# Patient Record
Sex: MALE | Race: WHITE | Employment: FULL TIME | URBAN - METROPOLITAN AREA
[De-identification: names, ages, dates, MRNs, and addresses within clinical notes are randomized per-mention and may not be internally consistent; named-entity substitution may affect disease eponyms.]

---

## 2018-08-24 ENCOUNTER — NURSE TRIAGE (OUTPATIENT)
Dept: NURSING | Facility: CLINIC | Age: 38
End: 2018-08-24

## 2018-08-24 NOTE — TELEPHONE ENCOUNTER
Redness is in the area of the cut. It is tender to touch and to move the ankle around. I advised taking a pen to mariia the edge of the redness and call back if that increases. His tetanus is up to date.  Jazzy Healy RN-[facility]      Additional Information    Negative: [1] Major bleeding (e.g., actively dripping or spurting) AND [2] can't be stopped    Negative: Amputation    Negative: Shock suspected (e.g., cold/pale/clammy skin, too weak to stand, low BP, rapid pulse)    Negative: [1] Knife wound (or other possibly deep cut) AND [2] to chest, abdomen, back, neck, or head    Negative: [1] Cutter (self-mutilator) AND [2] suicidal or out-of-control    Negative: Sounds like a life-threatening emergency to the triager    Negative: [1] Animal bite AND [2] broken skin    Negative: [1] Human bite AND [2] broken skin    Negative: [1] Bleeding AND [2] won't stop after 10 minutes of direct pressure (using correct technique)    Negative: Skin is split open or gaping  (or length > 1/2 inch or 12 mm on the skin, 1/4 inch or 6 mm on the face)    Negative: [1] Deep cut AND [2] can see bone or tendons    Negative: Sensation of something in the wound (i.e., retained object in wound)    Negative: [1] Dirt in the wound AND [2] not removed with 15 minutes of scrubbing    Negative: Wound causes numbness (i.e., loss of sensation)    Negative: Wound causes weakness (i.e., decreased ability to move hand, finger, toe)    Negative: [1] SEVERE pain AND [2] not improved 2 hours after pain medicine    Negative: [1] Looks infected AND [2] large red area (>2 inches or 5 cm) or streak    Negative: [1] Fever AND [2] bright red area or streak    Negative: Suspicious history for the injury    Negative: [1] Looks infected (spreading redness, pus) AND [2] no fever    Negative: [1] Last tetanus shot > 5 years ago AND [2] DIRTY cut    Negative: [1] Last tetanus shot > 10 years ago AND [2] CLEAN cut    Negative: [1] After 14 days AND  [2] wound isn't healed    Negative: [1] Diabetic (diabetes mellitus) AND [2] minor cut or scratch on foot    Negative: [1] Cutter (self-mutilator) AND [2] stable (i.e., not suicidal, not out of control)    Minor cut or scratch (all triage questions negative)    Protocols used: CUTS AND LACERATIONS-ADULT-

## 2019-10-11 ENCOUNTER — VIRTUAL VISIT (OUTPATIENT)
Dept: FAMILY MEDICINE | Facility: OTHER | Age: 39
End: 2019-10-11

## 2019-10-13 NOTE — PROGRESS NOTES
"Date: 10/11/2019 21:25:31  Clinician: Sven Mercado  Clinician NPI: [id number]  Patient: Edward Hinds  Patient : 1980  Patient Address: [address] [place] [id number]  Patient Phone: [phone]  Visit Protocol: Eye conditions  Patient Summary:  Edward is a 38 year old (: 1980 ) male who initiated a Visit for conjunctivitis.  When asked the question \"Please sign me up to receive news, health information and promotions from [employer].\", Edward responded \"No\".   A synchronous visit is necessary because the patient reported the following abnormal symptoms:   Sensitivity to light (requires clarification)   Images of his eye condition were uploaded.   His symptoms started today and affect the left eye. The symptoms consist of eye redness, light sensitivity, itchy eye(s), and drainage coming from the eye(s).   Symptom details     Drainage: The color of the drainage coming out of his eye(s) is green. The drainage is thick and causes his eyelids to be stuck shut in the morning.    Itchiness: Edward does not have seasonal allergies or hay fever.     Denied symptoms include eyelid swelling, eye pain, and bumps on the eyelid. Edward does not have subconjunctival hemorrhage and has not experienced a decrease in vision. He does not feel feverish.   Precipitating events  Edward has recently been exposed to someone with a red eye or an eye infection. In the past 2 weeks, he has had a cold or an ear infection.   Edward has not had a recent diagnosis of conjunctivitis. He also has not had a recent eye surgery, foreign body in the eye(s), and eye injury. He does not wear contact lenses.   Pertinent medical history  Edward has not ever been diagnosed with glaucoma.   Edward is not taking medication to treat his current symptoms.   Edward does not require proof of evaluation of his eye condition before returning to school, work, or .   Edward does not smoke or use smokeless tobacco.     MEDICATIONS: No current " medications, ALLERGIES: NKDA  Clinician Response:  Dear Edward,  Based on the information provided, you most likely have bacterial conjunctivitis, more commonly called pink eye.  Medication information  I am prescribing:  Polymyxin B sulf-trimethoprim (Polytrim) 10,000 unit- 1 mg/mL ophthalmic (eye) drops. Apply 1 drop into the affected eye(s) every 3 hours, up to 6 times a day for 7 days. There are no refills with this prescription.  The medication I prescribed is an antibiotic medication. Infections can be caused by either bacteria or a virus, and often have similar symptoms, so it is possible that this is a viral infection. Antibiotics are only effective against bacterial infections, so when it is caused by a virus, the medication will not help symptoms improve or make it less contagious.  Self care  To reduce the spread of the eye infection, be sure to wash your hands at least once per hour and avoid touching the eyes as much as possible.  The following will reduce the risk for future eye infections:     Frequent handwashing    Replace towels and washcloths daily    Do not share towels and washcloths with others     Steps you can take to be as comfortable as possible:     Avoid rubbing your eyes    Apply a cool compress to the eye(s)    Take regular breaks and remember to blink regularly when reading or using a computer for long periods of time    Wear sunglasses when outside    Wear eye protection when swimming or working with chemicals    Use good lighting     When to seek care  Please make an appointment to be seen in a clinic or urgent care if any of the following occurs:     You develop new symptoms or your symptoms becomes worse.    Your symptoms do not improve within 2 days of starting treatment.      Diagnosis: Bacterial conjunctivitis  Diagnosis ICD: H10.9  Triage Notes: I reviewed the patient's history, verified their identity, and explained the Visit process.    Patient states his daughter and family  have conjunctivitis. He started having a penis a redeye today. Little bit of sensitivity to light. He does not wear any contacts. He does not feel like there's anything in his eye. He denies any visual changes. I advised treatment as prophylaxis. Your symptoms worsen he should be seen in the clinic.  Synchronous Triage: phone, status: completed, duration: 239 seconds  Prescription: polymyxin B sulf-trimethoprim (Polytrim) 10,000 unit- 1 mg/mL ophthalmic (eye) drops 1 10 ml dropper bottle, 7 days supply. Apply 1 drop into the affected eye(s) every 3 hours up to 6 times a day for 7 days. Refills: 0, Refill as needed: no, Allow substitutions: yes  Pharmacy: [facility] - [phone] - [address] [facility] [place] [address]

## 2020-02-24 ENCOUNTER — HEALTH MAINTENANCE LETTER (OUTPATIENT)
Age: 40
End: 2020-02-24

## 2020-12-13 ENCOUNTER — HEALTH MAINTENANCE LETTER (OUTPATIENT)
Age: 40
End: 2020-12-13

## 2021-04-17 ENCOUNTER — HEALTH MAINTENANCE LETTER (OUTPATIENT)
Age: 41
End: 2021-04-17

## 2021-09-26 ENCOUNTER — HEALTH MAINTENANCE LETTER (OUTPATIENT)
Age: 41
End: 2021-09-26

## 2022-05-02 ENCOUNTER — APPOINTMENT (OUTPATIENT)
Dept: GENERAL RADIOLOGY | Facility: CLINIC | Age: 42
End: 2022-05-02
Attending: PHYSICIAN ASSISTANT
Payer: COMMERCIAL

## 2022-05-02 ENCOUNTER — HOSPITAL ENCOUNTER (EMERGENCY)
Facility: CLINIC | Age: 42
Discharge: HOME | End: 2022-05-02
Attending: PHYSICIAN ASSISTANT | Admitting: PHYSICIAN ASSISTANT
Payer: COMMERCIAL

## 2022-05-02 ENCOUNTER — APPOINTMENT (OUTPATIENT)
Dept: MRI IMAGING | Facility: CLINIC | Age: 42
End: 2022-05-02
Attending: PHYSICIAN ASSISTANT
Payer: COMMERCIAL

## 2022-05-02 VITALS
HEART RATE: 66 BPM | SYSTOLIC BLOOD PRESSURE: 118 MMHG | WEIGHT: 199 LBS | TEMPERATURE: 99 F | RESPIRATION RATE: 18 BRPM | BODY MASS INDEX: 26.95 KG/M2 | HEIGHT: 72 IN | OXYGEN SATURATION: 99 % | DIASTOLIC BLOOD PRESSURE: 87 MMHG

## 2022-05-02 DIAGNOSIS — M54.16 LUMBAR RADICULOPATHY: ICD-10-CM

## 2022-05-02 DIAGNOSIS — M51.16 LUMBAR DISC DISEASE WITH RADICULOPATHY: ICD-10-CM

## 2022-05-02 PROCEDURE — 99284 EMERGENCY DEPT VISIT MOD MDM: CPT | Performed by: PHYSICIAN ASSISTANT

## 2022-05-02 PROCEDURE — 99284 EMERGENCY DEPT VISIT MOD MDM: CPT | Mod: 25

## 2022-05-02 PROCEDURE — 72148 MRI LUMBAR SPINE W/O DYE: CPT

## 2022-05-02 PROCEDURE — 72100 X-RAY EXAM L-S SPINE 2/3 VWS: CPT

## 2022-05-02 PROCEDURE — 250N000013 HC RX MED GY IP 250 OP 250 PS 637: Performed by: PHYSICIAN ASSISTANT

## 2022-05-02 RX ORDER — METHYLPREDNISOLONE 4 MG
TABLET, DOSE PACK ORAL
Qty: 21 TABLET | Refills: 0 | Status: SHIPPED | OUTPATIENT
Start: 2022-05-02 | End: 2023-01-07

## 2022-05-02 RX ORDER — IBUPROFEN 200 MG
600 TABLET ORAL ONCE
COMMUNITY

## 2022-05-02 RX ORDER — IBUPROFEN 600 MG/1
600 TABLET, FILM COATED ORAL ONCE
Status: COMPLETED | OUTPATIENT
Start: 2022-05-02 | End: 2022-05-02

## 2022-05-02 RX ADMIN — IBUPROFEN 600 MG: 600 TABLET ORAL at 12:33

## 2022-05-02 NOTE — ED TRIAGE NOTES
Pt presents with concerns of increased pain in the back.  Pt states that pain has been for two weeks.  Pt states that the pain is the low the back down the right leg.  Pt states that he has a numbness feeling in the heel of the foot.   No particular trauma or fall that triggered this.  No medications taken today for pain, yesterday he states he tried Ibuprofen.

## 2022-05-02 NOTE — ED PROVIDER NOTES
History     Chief Complaint   Patient presents with     Back Pain       HPI  Edward Hinds is a 41 year old male who presents to the emergency department complaining of back pain. The patient reports he has had low back pain for the last 2 weeks.  He has been managing it with stretching.  Saturday, 2 days ago however he started having pain and numbness down his right posterior leg into the heel of his foot.  He took ibuprofen yesterday for the pain but has not taken anything otherwise since pain started.  He denies any loss of bowel or bladder control but does note some increased difficulty urinating like the urine does not come out as easily.  He had a bowel movement today which was normal.  Has noticed some numbness in the groin when sitting.  Sitting and laying flat makes the pain worse and he has been favoring his left leg due to the right leg hurting with weight.  He is walking okay.  No reported fevers or vomiting.  No known injury or trauma to the back.        Allergies:  Allergies   Allergen Reactions     Penicillin [Penicillins]      Mother says penicillin         Problem List:    Patient Active Problem List    Diagnosis Date Noted     GERD (gastroesophageal reflux disease) 02/22/2013     Priority: Medium     CARDIOVASCULAR SCREENING; LDL GOAL LESS THAN 160 01/08/2013     Priority: Medium        Past Medical History:    No past medical history on file.    Past Surgical History:    No past surgical history on file.    Family History:    Family History   Problem Relation Age of Onset     Hypertension Father      Alzheimer Disease Maternal Grandmother      Heart Disease Maternal Grandfather        Social History:  Marital Status:  Single [1]  Social History     Tobacco Use     Smoking status: Former Smoker     Packs/day: 0.50     Years: 12.00     Pack years: 6.00     Types: Cigarettes     Start date: 12/21/2013     Smokeless tobacco: Never Used   Substance Use Topics     Alcohol use: Yes     Comment:  1-3/week     Drug use: No        Medications:    ibuprofen (ADVIL/MOTRIN) 200 MG tablet  methylPREDNISolone (MEDROL DOSEPAK) 4 MG tablet therapy pack          Review of Systems   All other systems reviewed and are negative.      Physical Exam   BP: (!) 132/90  Pulse: 93  Temp: 99  F (37.2  C)  Resp: 18  Height: 182.9 cm (6')  Weight: 90.3 kg (199 lb)  SpO2: 98 %      Physical Exam  Vitals and nursing note reviewed. Exam conducted with a chaperone present.   Constitutional:       General: He is not in acute distress.     Appearance: Normal appearance. He is not ill-appearing, toxic-appearing or diaphoretic.   HENT:      Head: Normocephalic and atraumatic.   Eyes:      Extraocular Movements: Extraocular movements intact.      Conjunctiva/sclera: Conjunctivae normal.   Pulmonary:      Effort: Pulmonary effort is normal. No respiratory distress.   Genitourinary:     Rectum: Normal anal tone.      Comments: Bladder scan with postvoid residual: 18 mL  Musculoskeletal:      Cervical back: Normal and neck supple.      Thoracic back: Normal.      Lumbar back: Tenderness (right buttocks soft tissue) and bony tenderness (to low lumbar upper sacral midline region) present. Positive right straight leg raise test. Negative left straight leg raise test.      Comments: Patient exhibits normal strength with flexion and extension of hips bilaterally.  Normal dorsi and plantar flexion bilaterally.  Normal gait.  Able to stand on one foott independently with each side, though right appears slightly wobbly.   Skin:     General: Skin is warm and dry.   Neurological:      General: No focal deficit present.      Mental Status: He is alert and oriented to person, place, and time. Mental status is at baseline.   Psychiatric:         Mood and Affect: Mood normal.         Behavior: Behavior normal.         ED Course           Procedures      Results for orders placed or performed during the hospital encounter of 05/02/22 (from the past 24  hour(s))   Lumbar spine XR, 2-3 views    Narrative    LUMBAR SPINE TWO TO THREE VIEWS   5/2/2022 1:00 PM     HISTORY: Low back pain    COMPARISON: None.      Impression    IMPRESSION: No fracture is identified. Mild lower lumbar spine  degenerative disc disease and mild-to-moderate lower lumbar spine  facet arthropathy. Probable grade 1 retrolisthesis of L4 on L5.  Levoconvex curvature of the thoracolumbar spine.    RIGOBERTO ARANGO MD         SYSTEM ID:  [id number]   Lumbar spine MRI w/o contrast - surgery >10 yrs or none    Narrative    EXAM: MR LUMBAR SPINE W/O CONTRAST  LOCATION: [facility]  DATE/TIME: 5/2/2022 5:30 PM    INDICATION: Low back pain, cauda equina syndrome suspected.  COMPARISON: None.  TECHNIQUE: Routine Lumbar Spine MRI without IV contrast.    FINDINGS:   Nomenclature is based on 5 lumbar type vertebral bodies. Normal vertebral body heights, alignment and marrow signal. No fracture, destructive bone lesion or infection. Normal distal spinal cord and cauda equina with conus medullaris at T12-L1. No   extraspinal abnormality. Unremarkable visualized bony pelvis.    Findings on a level by level basis are as follows:    T12-L1: Normal disc height and signal. No herniation. Normal facets. No spinal canal or neural foraminal stenosis.     L1-L2: Normal disc height and signal. No herniation. Normal facets. No spinal canal or neural foraminal stenosis.    L2-L3: Normal disc height and signal. No herniation. Normal facets. No spinal canal or neural foraminal stenosis.     L3-L4: Normal disc height and signal. Shallow disc bulge. No disc herniation. Mild facet arthrosis and ligamentum flavum thickening. Mild bilateral neural foraminal stenosis. Mild spinal canal stenosis.    L4-L5: Mild disc height loss. Loss of disc signal. Disc bulge with superimposed 5 mm AP dimension right subarticular inferiorly migrated disc extrusion, which partially effaces the right subarticular zone  and displaces the descending right L5 nerve   roots. Facet arthrosis and ligamentum flavum thickening. Mild bilateral neural foraminal stenosis. Mild to moderate spinal canal stenosis.    L5-S1: Moderate disc height loss. Loss of disc signal. Disc bulge with superimposed 10 mm AP dimension by 24 mm craniocaudal dimension superiorly migrated right subarticular disc extrusion, which displaces the descending right L5 nerve roots within the   right L5 lateral recess and right S1 nerve roots in the subarticular zone. Facet arthrosis. Mild bilateral neural foraminal stenosis. Moderate spinal canal stenosis.      Impression    IMPRESSION:  1. Multilevel lumbar spondylosis with large superiorly migrated right subarticular disc extrusion at L5-S1, which displaces and likely impinges both the descending right L5 and S1 nerve root segments.  2. Small right subarticular disc protrusion at L4-L5 displaces the descending right L5 nerve roots.  3. Moderate spinal canal stenosis at L5-S1. Mild to moderate spinal canal stenosis at L4-L5.  4. Mild to moderate left neural foraminal stenosis at L4-L5.  5. Mild bilateral neural foraminal stenosis L3-S1.       Medications   ibuprofen (ADVIL/MOTRIN) tablet 600 mg (600 mg Oral Given 5/2/22 1233)         Assessments & Plan (with Medical Decision Making)  Edward Hinds is a 41 year old plaint of back pain for the last 2 weeks, worsened in the last 2 days with associated numbness and pain down the right leg.  Also notes some hesitancy with urination but BMs are normal did note some numbness in the groin with seeding but none currently.  Denies any injury or trauma to the back.  On arrival to the ED he was afebrile with normal vital signs.  Exam today demonstrated bony tenderness in the low lumbosacral region with muscular tenderness in the right buttocks he did have normal strength in both legs but did seem a little wobbly standing on the right foot.  Positive straight leg raise on the  right.  Negative on the left.  Symptoms are highly concerning for spinal impingement of some sort.  Question if he could have cauda equina given his complaints of occasional groin numbness and urinary hesitancy.  Discussed option of MRI with the patient.  There would be a delay with MRI so he preferred to start with x-ray.  Offered him something for pain and he requested ibuprofen so this was given.  X-ray of the spine showed some degeneration but nothing acute otherwise.  I do have concern for more severe neurological etiology so after discussing with patient he was agreeable to waiting several hours to have an MRI completed this evening.  He did report ibuprofen alleviated his symptoms.  MRI today did show a large superiorly migrated right subarticular disc extrusion at L5-S1 which displaces and impinged both the descending right L5 and S1 nerve root segments.  He had additional findings to include moderate spinal canal stenosis at L5-S1 with other stenosis findings at L3-S1 and L4-L5.  He had a small right subarticular disc protrusion at L4-L5 displacing the descending right L5 nerve roots.  Given severity of MRI findings, I did call and speak with neurosurgical CHANDRAKANT Cochran.  She reviewed images and patient's case with her team.  I did check rectal tone and this was normal and we did a postvoid residual bladder scan which showed less than 18 mL, both of which are reassuring findings.  At this point, with MRI findings in combination with exam, neurosurgical team felt it reasonable to have him follow-up closely in the clinic to discuss options, likely will need surgery down the road.  The clinic will call him tomorrow to schedule this for him.  In the meantime he will be prescribed a Medrol Dosepak and I advised continued use of ibuprofen for pain control as needed.  I did go over warning signs and symptoms of when he should return to the ED.  All questions were answered and patient was discharged home in  suitable condition.     I have reviewed the nursing notes.    I have reviewed the findings, diagnosis, plan and need for follow up with the patient.    New Prescriptions    METHYLPREDNISOLONE (MEDROL DOSEPAK) 4 MG TABLET THERAPY PACK    Follow Package Directions       Final diagnoses:   Lumbar radiculopathy   Lumbar disc disease with radiculopathy     Note: Chart documentation done in part with Dragon Voice Recognition software. Although reviewed after completion, some word and grammatical errors may remain.     5/2/2022   [facility] EMERGENCY DEPT     Jocelin High PA-C  05/02/22 [id number]

## 2022-05-03 NOTE — PROGRESS NOTES
Contacted regarding 40 yo presenting to ER with 2 weeks of low back pain and 3 days of right leg pain and numbness. Per ER scrotal numbness when sitting.  No bowel or bladder incontinence.  Strength intact per ER, normal rectal tone, no urinary retention (PVR 18)   MRI reveals large HNP at L5-S1.    IMPRESSION:  1. Multilevel lumbar spondylosis with large superiorly migrated right subarticular disc extrusion at L5-S1, which displaces and likely impinges both the descending right L5 and S1 nerve root segments.  2. Small right subarticular disc protrusion at L4-L5 displaces the descending right L5 nerve roots.  3. Moderate spinal canal stenosis at L5-S1. Mild to moderate spinal canal stenosis at L4-L5.  4. Mild to moderate left neural foraminal stenosis at L4-L5.  5. Mild bilateral neural foraminal stenosis L3-S1.    RECOMMENDATIONS:  Okay to discharge from ER with close outpatient NS follow up.  Our office will contact patient to arrange clinic visit.  Medrol dose pack.    Discussed with Dr. Dudley.    Leonor Cochran, MPAS  [facility] Neurosurgery  [address] [address]    Tel [phone]  Pager [phone]

## 2022-05-05 ENCOUNTER — TELEPHONE (OUTPATIENT)
Dept: NEUROSURGERY | Facility: OTHER | Age: 42
End: 2022-05-05

## 2022-05-05 ENCOUNTER — TELEPHONE (OUTPATIENT)
Dept: PALLIATIVE MEDICINE | Facility: CLINIC | Age: 42
End: 2022-05-05

## 2022-05-05 ENCOUNTER — OFFICE VISIT (OUTPATIENT)
Dept: NEUROSURGERY | Facility: CLINIC | Age: 42
End: 2022-05-05
Attending: PHYSICIAN ASSISTANT
Payer: COMMERCIAL

## 2022-05-05 VITALS — OXYGEN SATURATION: 97 % | DIASTOLIC BLOOD PRESSURE: 94 MMHG | HEART RATE: 64 BPM | SYSTOLIC BLOOD PRESSURE: 136 MMHG

## 2022-05-05 DIAGNOSIS — M51.16 LUMBAR DISC DISEASE WITH RADICULOPATHY: ICD-10-CM

## 2022-05-05 DIAGNOSIS — M54.16 LUMBAR RADICULOPATHY: ICD-10-CM

## 2022-05-05 PROCEDURE — 99203 OFFICE O/P NEW LOW 30 MIN: CPT | Performed by: NEUROLOGICAL SURGERY

## 2022-05-05 RX ORDER — OXYCODONE HYDROCHLORIDE 5 MG/1
5 TABLET ORAL EVERY 6 HOURS PRN
Qty: 30 TABLET | Refills: 0 | Status: SHIPPED | OUTPATIENT
Start: 2022-05-05 | End: 2023-01-07

## 2022-05-05 ASSESSMENT — PAIN SCALES - GENERAL: PAINLEVEL: MILD PAIN (3)

## 2022-05-05 NOTE — PROGRESS NOTES
I was asked by Dr. High to see this patient in consultation    41 year old male with severe right leg pain, disc herniation.  A couple weeks of back pain after re-doing the radha in his bathroom.  This weekend, developed, severe 10/10, shooting pain radiating to the right buttocks, thigh, calf, and foot, worse with activity.  Went to the ED, and was started on a steroid pack.  MR Lumbar, personally reviewed, showed large right L5-S1 extruded disc herniation and L4-5 disc herniation.       No past medical history on file.  No past surgical history on file.  Social History     Socioeconomic History     Marital status: Single     Spouse name: Not on file     Number of children: Not on file     Years of education: Not on file     Highest education level: Not on file   Occupational History     Not on file   Tobacco Use     Smoking status: Former Smoker     Packs/day: 0.50     Years: 12.00     Pack years: 6.00     Types: Cigarettes     Start date: 12/21/2013     Smokeless tobacco: Never Used   Substance and Sexual Activity     Alcohol use: Yes     Comment: 1-3/week     Drug use: No     Sexual activity: Yes     Partners: Female   Other Topics Concern     Parent/sibling w/ CABG, MI or angioplasty before 65F 55M? No   Social History Narrative     Not on file     Social Determinants of Health     Financial Resource Strain: Not on file   Food Insecurity: Not on file   Transportation Needs: Not on file   Physical Activity: Not on file   Stress: Not on file   Social Connections: Not on file   Intimate Partner Violence: Not on file   Housing Stability: Not on file     Family History   Problem Relation Age of Onset     Hypertension Father      Alzheimer Disease Maternal Grandmother      Heart Disease Maternal Grandfather         ROS: 10 point ROS neg other than the symptoms noted above in the HPI.    Physical Exam  BP (!) 136/94   Pulse 64   SpO2 97%   HEENT:  Normocephalic, atraumatic.  PERRLA.  EOM s intact.  Visual  fields full to gross exam  Neck:  Supple, non-tender, without lymphadenopathy.  Heart:  No peripheral edema  Lungs:  No SOB  Abdomen:  Non-distended.   Skin:  Warm and dry.  Extremities:  No edema, cyanosis or clubbing.  Psychiatric:  No apparent distress  Musculoskeletal:  Normal bulk and tone    NEUROLOGICAL EXAMINATION:     Mental status:  Alert and Oriented x 3, speech is fluent.  Cranial nerves:  II-XII intact.   Motor:    Shoulder Abduction:  Right:  5/5   Left:  5/5  Biceps:                      Right:  5/5   Left:  5/5  Triceps:                     Right:  5/5   Left:  5/5  Wrist Extensors:       Right:  5/5   Left:  5/5  Wrist Flexors:           Right:  5/5   Left:  5/5  interosseus :            Right:  5/5   Left:  5/5  Hip Flexion:                Right: 5/5  Left:  5/5  Quadriceps:             Right:  5/5  Left:  5/5  Hamstrings:             Right:  5/5  Left:  5/5  Gastroc Soleus:        Right:  5/5  Left:  5/5  Tib/Ant:                      Right:  5/5  Left:  5/5  EHL:                     Right:  5/5  Left:  5/5  Sensation:  Intact  Reflexes:  Negative Babinski.  Negative Clonus.  Negative Mckeon's.  Coordination:  Smooth finger to nose testing.   Negative pronator drift.  Smooth tandem walking.    A/P:  41 year old male with severe right leg pain, disc herniation    I had a discussion with the patient, reviewing the history, symptoms, and imaging  Discussed options of TALYA/PT vs surgery  He is planning to try the injection

## 2022-05-05 NOTE — TELEPHONE ENCOUNTER
Patient tried to schedule injection at [facility], and can't get in for at least a month.  Scheduling looked into Priyank's schedule and his clinic for consults is full for month of May.  Patient was under the impression that an injection for pain could be done immediately.  Please advise patient on how to proceed.

## 2022-05-05 NOTE — NURSING NOTE
Edward Hinds is a 41 year old male who presents for:  Chief Complaint   Patient presents with     Neurologic Problem     ED follow-up, lumbar disc extrusion L5-S1        Initial Vitals:  BP (!) 136/94   Pulse 64   SpO2 97%  Estimated body mass index is 26.99 kg/m  as calculated from the following:    Height as of 5/2/22: 6' (1.829 m).    Weight as of 5/2/22: 199 lb (90.3 kg).. There is no height or weight on file to calculate BSA. BP completed using cuff size: regular  Mild Pain (3)    Nursing Comments: 3/10 pain. R LBP radiates to LE w/ tingling/numbness present.    Wilber Oneill MA

## 2022-05-05 NOTE — PATIENT INSTRUCTIONS
Script for Oxycodone      Order placed for a Right L4-5 and L5-S1 epidural steroid injection. The steroid can take 14 days to reach max effect. Please call our clinic if symptoms persist after this timeframe. [facility] will call to schedule your injection, however if you don't hear from them within 48 hours, call [phone]    [facility] They will call you to schedule within a few days. If you have not heard from them, please call [phone].  Please call our clinic if symptoms persist after your course of physical therapy (approximately 4 weeks).     [facility]  [facility] - [address] [address]  Telephone:  [phone]       Fax:  [phone]

## 2022-05-05 NOTE — TELEPHONE ENCOUNTER
Patient wife Leslie called, notified her [facility]ita next open availability was June 3rd, she explained they  didn't want to wait that long and will call other places to see if they can get him in sooner.

## 2022-05-06 NOTE — TELEPHONE ENCOUNTER
Patient called in stating that he can't get in for at least a month at  for an injection.  Scheduling looked into Priyank's schedule and his clinic for consults is full for month of May    Patient sent [employer] message to patient with external location options and numbers to call to inquire about soonest appt date, as patient wants to get in asa.

## 2022-05-08 ENCOUNTER — HEALTH MAINTENANCE LETTER (OUTPATIENT)
Age: 42
End: 2022-05-08

## 2022-05-11 RX ORDER — NICOTINE POLACRILEX 4 MG
15-30 LOZENGE BUCCAL
Status: DISCONTINUED | OUTPATIENT
Start: 2022-05-11 | End: 2022-05-14 | Stop reason: HOSPADM

## 2022-05-11 RX ORDER — DEXTROSE MONOHYDRATE 25 G/50ML
25-50 INJECTION, SOLUTION INTRAVENOUS
Status: DISCONTINUED | OUTPATIENT
Start: 2022-05-11 | End: 2022-05-14 | Stop reason: HOSPADM

## 2022-05-12 ENCOUNTER — TELEPHONE (OUTPATIENT)
Dept: MEDSURG UNIT | Facility: CLINIC | Age: 42
End: 2022-05-12
Payer: COMMERCIAL

## 2022-05-13 ENCOUNTER — HOSPITAL ENCOUNTER (OUTPATIENT)
Dept: GENERAL RADIOLOGY | Facility: CLINIC | Age: 42
Discharge: HOME | End: 2022-05-13
Attending: NEUROLOGICAL SURGERY
Payer: COMMERCIAL

## 2022-05-13 ENCOUNTER — HOSPITAL ENCOUNTER (OUTPATIENT)
Facility: CLINIC | Age: 42
Discharge: HOME | End: 2022-05-13
Admitting: PHYSICIAN ASSISTANT
Payer: COMMERCIAL

## 2022-05-13 VITALS
HEART RATE: 58 BPM | RESPIRATION RATE: 16 BRPM | DIASTOLIC BLOOD PRESSURE: 94 MMHG | OXYGEN SATURATION: 95 % | SYSTOLIC BLOOD PRESSURE: 146 MMHG

## 2022-05-13 VITALS — OXYGEN SATURATION: 98 % | SYSTOLIC BLOOD PRESSURE: 127 MMHG | DIASTOLIC BLOOD PRESSURE: 87 MMHG | HEART RATE: 62 BPM

## 2022-05-13 DIAGNOSIS — M54.16 LUMBAR RADICULOPATHY: ICD-10-CM

## 2022-05-13 PROCEDURE — 999N000154 HC STATISTIC RADIOLOGY XRAY, US, CT, MAR, NM

## 2022-05-13 PROCEDURE — 62323 NJX INTERLAMINAR LMBR/SAC: CPT

## 2022-05-13 PROCEDURE — 250N000009 HC RX 250: Performed by: NEUROLOGICAL SURGERY

## 2022-05-13 PROCEDURE — 255N000002 HC RX 255 OP 636: Performed by: NEUROLOGICAL SURGERY

## 2022-05-13 PROCEDURE — 250N000011 HC RX IP 250 OP 636: Performed by: NEUROLOGICAL SURGERY

## 2022-05-13 RX ORDER — IOPAMIDOL 408 MG/ML
10 INJECTION, SOLUTION INTRATHECAL ONCE
Status: COMPLETED | OUTPATIENT
Start: 2022-05-13 | End: 2022-05-13

## 2022-05-13 RX ORDER — BETAMETHASONE SODIUM PHOSPHATE AND BETAMETHASONE ACETATE 3; 3 MG/ML; MG/ML
6 INJECTION, SUSPENSION INTRA-ARTICULAR; INTRALESIONAL; INTRAMUSCULAR; SOFT TISSUE ONCE
Status: COMPLETED | OUTPATIENT
Start: 2022-05-13 | End: 2022-05-13

## 2022-05-13 RX ADMIN — BETAMETHASONE SODIUM PHOSPHATE AND BETAMETHASONE ACETATE 3 MG: 3; 3 INJECTION, SUSPENSION INTRA-ARTICULAR; INTRALESIONAL; INTRAMUSCULAR at 09:42

## 2022-05-13 RX ADMIN — LIDOCAINE HYDROCHLORIDE 8 ML: 10 INJECTION, SOLUTION EPIDURAL; INFILTRATION; INTRACAUDAL; PERINEURAL at 09:21

## 2022-05-13 RX ADMIN — IOPAMIDOL 2 ML: 408 INJECTION, SOLUTION INTRATHECAL at 09:41

## 2022-05-13 NOTE — PROGRESS NOTES
Care Suites Discharge Nursing Note    Patient Information  Name: Edward Hinds  Age: 41 year old    Discharge Education:  Discharge instructions reviewed: Yes  Additional education/resources provided: yes  Patient/patient representative verbalizes understanding: Yes  Patient discharging on new medications: No  Medication education completed: N/A    Discharge Plans:   Discharge location: home  Discharge ride contacted: Yes  Approximate discharge time: 1010    Discharge Criteria:  Discharge criteria met and vital signs stable: Yes    Patient Belongs:  Patient belongings returned to patient: N/A    Margie Taveras RN

## 2022-05-13 NOTE — DISCHARGE INSTRUCTIONS
Steroid Injection Discharge Instructions     After you go home:    You may resume your normal diet.    Care of Puncture Site:    If you have a bandaid on your puncture site, you may remove it the next morning  You may shower tomorrow  No bath tubs, whirlpools or swimming pool for at least 48 hours  Use ice packs as needed for discomfort     Activity:    Minimize your activity today. You may gradually resume your normal activity as tolerated  Avoid vigorous or strenuous activity until your symptoms improve or as directed by your doctor  Do NOT drive a vehicle for a few hours after the injection - or longer if you develop numbness in your arm or leg    Medicines:    You may resume all medications, including blood thinners  For minor discomfort, you may take Acetaminophen (Tylenol) or Ibuprofen (Advil)    Pain:     You may experience increased or different pain over the next 24-48 hours  For the next 48 hrs - you may use ice packs for discomfort     Call your primary care doctor if:    You have severe pain that does not improve with pain medication  You have chills or a fever greater than 101 F (38 C)  The site is red, swollen, hot or tender  Increase in pain, weakness or numbness  New problems with your bowel or bladder  Any questions or concerns    What to watch for:    It can be normal to have some bruising or slight swelling at the puncture site.   After the procedure, you may have some new weakness or numbness down your arm/leg from the numbing medicine. This should resolve in a few hours.   You may feel some temporary relief from the numbing medicine, but that will wear off within a few hours.  Your symptoms may return to pre procedure level, or can even be worse for the first 1-2 days.  For many people, the steroid begins to provide some relief within 2-3 days, but it can take up to 2 weeks to obtain the full results.  Some people will get lasting relief from a single injection. Others may require up to 3  injections to get results. If you have more than one steroid injection, they should be given 2 weeks apart.  If you have no improvement in your symptoms after two weeks, please contact the doctor who ordered this procedure to discuss the next steps.  Side effects of your steroid injection are mild and will go away in 2-3 days  Insomnia  Irritability  Flushed face  Water retention  Restlessness  Difficulty sleeping  Increased appetite  Increased blood sugar      If you have questions or concerns call:                  [facility] Dept @ [phone]                                    between 8am-4:30pm Mon-Fri    If you have urgent questions outside of these normal business hours, please contact the [facility] on call doctor @ [phone]

## 2022-05-13 NOTE — PROCEDURES
[facility]    Procedure: L4-L5 TALYA and Caudal TALYA for treatment of L5-S1 DDD    Date/Time: 5/13/2022 9:35 AM  Performed by: Den Herrera PA-C  Authorized by: Den Herrera PA-C       UNIVERSAL PROTOCOL   Site Marked: Yes  Prior Images Obtained and Reviewed:  Yes  Required items: Required blood products, implants, devices and special equipment available    Patient identity confirmed:  Verbally with patient  Patient was reevaluated immediately before administering moderate or deep sedation or anesthesia  Confirmation Checklist:  Patient's identity using two indicators, relevant allergies, procedure was appropriate and matched the consent or emergent situation and correct equipment/implants were available  Time out: Immediately prior to the procedure a time out was called    Universal Protocol: the Joint Commission Universal Protocol was followed    Preparation: Patient was prepped and draped in usual sterile fashion       ANESTHESIA    Local Anesthetic: Lidocaine 1% without epinephrine      SEDATION    Patient Sedated: No    See dictated procedure note for full details.    PROCEDURE  Describe Procedure: Minimal posterior epidural fat at L5-S1. Used caudal approach for S1 nerve treatment.  L4-L5 IL TALYA traveled mostly cranially.  Patient Tolerance:  Patient tolerated the procedure well with no immediate complications  Length of time physician/provider present for 1:1 monitoring during sedation: 0

## 2022-05-13 NOTE — PROGRESS NOTES
Care Suites Post Procedure Note    Patient Information  Name: Edward Hinds  Age: 41 year old    Post Procedure  Time patient returned to Care Suites: [id number]  Concerns/abnormal assessment: none  If abnormal assessment, provider notified: Yes  Plan/Other: discharge at 1000    Margie Taveras RN

## 2022-05-17 ENCOUNTER — HOSPITAL ENCOUNTER (OUTPATIENT)
Dept: PHYSICAL THERAPY | Facility: CLINIC | Age: 42
Setting detail: THERAPIES SERIES
Discharge: HOME | End: 2022-05-17
Attending: NEUROLOGICAL SURGERY
Payer: COMMERCIAL

## 2022-05-17 DIAGNOSIS — M54.16 LUMBAR RADICULOPATHY: ICD-10-CM

## 2022-05-17 PROCEDURE — 97161 PT EVAL LOW COMPLEX 20 MIN: CPT | Mod: GP | Performed by: PHYSICAL THERAPIST

## 2022-05-17 PROCEDURE — 97112 NEUROMUSCULAR REEDUCATION: CPT | Mod: GP | Performed by: PHYSICAL THERAPIST

## 2022-05-17 PROCEDURE — 97110 THERAPEUTIC EXERCISES: CPT | Mod: GP | Performed by: PHYSICAL THERAPIST

## 2022-05-17 NOTE — PROGRESS NOTES
05/17/22 0815   General Information   Type of Visit Initial OP Ortho PT Evaluation   Start of Care Date 05/17/22   Referring Physician Dr. Dudley   Patient/Family Goals Statement To get back to normal self.   Orders Evaluate and Treat   Orders Comment None   Date of Order 05/05/22   Certification Required? No   Medical Diagnosis Lumbar radiculopathy   Surgical/Medical history reviewed No   Precautions/Limitations no known precautions/limitations  (No heavy lifting)   Weight-Bearing Status - LUE full weight-bearing   Weight-Bearing Status - RUE full weight-bearing   Weight-Bearing Status - LLE full weight-bearing   Weight-Bearing Status - RLE full weight-bearing   Body Part(s)   Body Part(s) Lumbar Spine/SI   Presentation and Etiology   Pertinent history of current problem (include personal factors and/or comorbidities that impact the POC) Edward reports that he was working on his bathroom, ripping up his bathroom floor, he initially felt discomfort with bending. He then started feeling numbness into the right buttock, down the back of his leg into his heel. He ignored it for about 3 days and noticed that he was spending more time lying on the ground. When the pain was getting too bad he couldn't drive so was working from home. He went to the ED, put on a medrol dose pack. He then saw Dr. Dudley and had an epidural last Friday. Since the epidural he is feeling a lot better and feeling some of the tingling coming back. He is no longer needing pain meds but it is not perfect.   Impairments A. Pain;L. Tingling;K. Numbness;D. Decreased ROM;F. Decreased strength and endurance;H. Impaired gait   Functional Limitations perform activities of daily living;perform required work activities;perform desired leisure / sports activities   Symptom Location Right low back buttock, posterior thigh, calf and numbness to the bottom of the foot.   How/Where did it occur At home  (Bending, tearing out a floor.)   Onset date of current  episode/exacerbation 04/29/22   Chronicity New   Pain rating (0-10 point scale) Other   Pain rating comment Low back is a 5.5/10 typical and 10/10 at worst. The posterior thigh and heel symptoms are a 4/10.   Pain quality H. Other   Pain quality comment Describes more of a heaviness, tingling/numbness than pain   Frequency of pain/symptoms D. Other   Pain frequency comment The back is 50-60% of the day, the buttock and right lower extremity is constant   Pain/symptoms are: Other   Pain symptoms comment Symptoms get worse as the day progresses.   Pain/symptoms exacerbated by I. Bending;G. Certain positions;B. Walking;J. ADL;K. Home tasks;L. Work tasks   Pain/symptoms eased by E. Changing positions;C. Rest;F. Certain positions   Progression of symptoms since onset: Improved  (Since epidural)   Current / Previous Interventions   Diagnostic Tests: MRI   MRI Results Results   MRI results large superiorly migrated right subarticular disc extrusion at L5-S1   Prior Level of Function   Prior Level of Function-Mobility Independent ambulator   Prior Level of Function-ADLs Independent   Functional Level Prior Comment Is an active dad of 5 children, the 6th is on her way.   Current Level of Function   Current Community Support Family/friend caregiver   Patient role/employment history A. 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   Living environment House/townhome   Home/community accessibility No concerns reported   Current equipment-Gait/Locomotion None   Current equipment-ADL None   Fall Risk Screen   Fall screen completed by PT   Have you fallen 2 or more times in the past year? No   Have you fallen and had an injury in the past year? No   Is patient a fall risk? No   System Outcome Measures   Outcome Measures Low Back Pain (see Oswestry and Demi)   Functional Scales   Functional Scales Other   Other Scales  Pt. self reports he is functioning at 60% of his normal.    Lumbar Spine/SI Objective Findings   Gait/Locomotion Non-antalgic gait pattern   Balance/Proprioception (Single Leg Stance) NT   Flexion ROM Limited by 50%   Extension ROM Limited by 70%   Lumbar/SI Special Tests Comments Directional preference using Static/Dynamic Force Analysis. Starting symptoms in standing: right posterior thigh 1/10, heel 1.5/10. Standing flexion does not change symptoms, standing extension decreases the heel to a .5/10. Hooklying is deferred at this time. Prone lying abolishes all symptoms, TATIANA produces low back discomfort 1/10 all other symptoms 0. Prone press-up produces left hamstring. Returned to prone lying and all symptoms are abolished.   Observation Edward is a healthy, well groomed 41 year old male who demonstrates mild/mod. pain behaviors.   Integumentary No deficits identified   Posture neutral sitting and standing posture.   Planned Therapy Interventions   Planned Therapy Interventions manual therapy;neuromuscular re-education;ROM   Planned Therapy Interventions Comment ther. ex., ther. activity, directional preference   Planned Modality Interventions   Planned Modality Interventions Electrical stimulation;Cryotherapy;Hydrotherapy;Ultrasound;Traction   Planned Modality Interventions Comments Will use if needed for symptom control.   Clinical Impression   Criteria for Skilled Therapeutic Interventions Met yes, treatment indicated   PT Diagnosis Mechanical low back pain with radiating right lower extremity symptoms to the heel.   Influenced by the following impairments Pain, numbness, tingling, limited ROM and function   Functional limitations due to impairments Decreased ability to bend, unable to sit, stand or walk for prolonged periods of time.   Clinical Presentation Stable/Uncomplicated   Clinical Presentation Rationale Clinical judgement, history, evaluation   Clinical Decision Making (Complexity) Low complexity   Therapy Frequency 2 times/Week   Predicted Duration of Therapy  Intervention (days/wks) 90 days   Risk & Benefits of therapy have been explained Yes   Patient, Family & other staff in agreement with plan of care Yes   Clinical Impression Comments Edward is demonstrating mechanical low back involvement with radiating right lower extremity symptoms. 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 Will continue to assess strength, bowel and bladder symptoms.   Education Assessment   Preferred Learning Style Listening;Reading;Demonstration   Barriers to Learning No barriers   ORTHO GOALS   PT Ortho Eval Goals 1;2;3   Ortho Goal 1   Goal Identifier Symptoms   Goal Description Edward will use strategies learned in P.T. to decrease symptoms by 50% helping to reach personal goal of symptom relief.   Target Date 06/16/22   Ortho Goal 2   Goal Identifier Radiating symptoms   Goal Description Edward will no longer experience symptoms to the foot indicating centralization of symptoms.   Target Date 07/16/22   Ortho Goal 3   Goal Identifier Function   Goal Description Edward will return to full symptom free trunk ROM, without lower extremity symptoms.   Target Date 08/15/22   Total Evaluation Time   PT Eval, Low Complexity Minutes (39017) 30

## 2022-05-20 ENCOUNTER — HOSPITAL ENCOUNTER (OUTPATIENT)
Dept: PHYSICAL THERAPY | Facility: CLINIC | Age: 42
Setting detail: THERAPIES SERIES
Discharge: HOME | End: 2022-05-20
Attending: NEUROLOGICAL SURGERY
Payer: COMMERCIAL

## 2022-05-20 PROCEDURE — 97112 NEUROMUSCULAR REEDUCATION: CPT | Mod: GP | Performed by: PHYSICAL THERAPIST

## 2022-05-20 PROCEDURE — 97530 THERAPEUTIC ACTIVITIES: CPT | Mod: GP | Performed by: PHYSICAL THERAPIST

## 2022-05-20 PROCEDURE — 97110 THERAPEUTIC EXERCISES: CPT | Mod: GP | Performed by: PHYSICAL THERAPIST

## 2022-05-24 ENCOUNTER — HOSPITAL ENCOUNTER (OUTPATIENT)
Dept: PHYSICAL THERAPY | Facility: CLINIC | Age: 42
Setting detail: THERAPIES SERIES
Discharge: HOME | End: 2022-05-24
Attending: NEUROLOGICAL SURGERY
Payer: COMMERCIAL

## 2022-05-24 PROCEDURE — 97110 THERAPEUTIC EXERCISES: CPT | Mod: GP | Performed by: PHYSICAL THERAPIST

## 2022-06-01 ENCOUNTER — HOSPITAL ENCOUNTER (OUTPATIENT)
Dept: PHYSICAL THERAPY | Facility: CLINIC | Age: 42
Setting detail: THERAPIES SERIES
Discharge: HOME | End: 2022-06-01
Attending: NEUROLOGICAL SURGERY
Payer: COMMERCIAL

## 2022-06-01 PROCEDURE — 97112 NEUROMUSCULAR REEDUCATION: CPT | Mod: GP | Performed by: PHYSICAL THERAPIST

## 2022-06-01 PROCEDURE — 97110 THERAPEUTIC EXERCISES: CPT | Mod: GP | Performed by: PHYSICAL THERAPIST

## 2022-06-07 ENCOUNTER — HOSPITAL ENCOUNTER (OUTPATIENT)
Dept: PHYSICAL THERAPY | Facility: CLINIC | Age: 42
Setting detail: THERAPIES SERIES
Discharge: HOME | End: 2022-06-07
Attending: NEUROLOGICAL SURGERY
Payer: COMMERCIAL

## 2022-06-07 PROCEDURE — 97110 THERAPEUTIC EXERCISES: CPT | Mod: GP | Performed by: PHYSICAL THERAPIST

## 2022-06-07 PROCEDURE — 97112 NEUROMUSCULAR REEDUCATION: CPT | Mod: GP | Performed by: PHYSICAL THERAPIST

## 2022-06-07 PROCEDURE — 97530 THERAPEUTIC ACTIVITIES: CPT | Mod: GP | Performed by: PHYSICAL THERAPIST

## 2022-06-10 ENCOUNTER — HOSPITAL ENCOUNTER (OUTPATIENT)
Dept: PHYSICAL THERAPY | Facility: CLINIC | Age: 42
Setting detail: THERAPIES SERIES
Discharge: HOME | End: 2022-06-10
Attending: NEUROLOGICAL SURGERY
Payer: COMMERCIAL

## 2022-06-10 PROCEDURE — 97530 THERAPEUTIC ACTIVITIES: CPT | Mod: GP | Performed by: PHYSICAL THERAPIST

## 2022-06-10 PROCEDURE — 97112 NEUROMUSCULAR REEDUCATION: CPT | Mod: GP | Performed by: PHYSICAL THERAPIST

## 2022-06-17 ENCOUNTER — HOSPITAL ENCOUNTER (OUTPATIENT)
Dept: PHYSICAL THERAPY | Facility: CLINIC | Age: 42
Setting detail: THERAPIES SERIES
Discharge: HOME | End: 2022-06-17
Attending: NEUROLOGICAL SURGERY
Payer: COMMERCIAL

## 2022-06-17 PROCEDURE — 97112 NEUROMUSCULAR REEDUCATION: CPT | Mod: GP | Performed by: PHYSICAL THERAPIST

## 2022-06-17 PROCEDURE — 97530 THERAPEUTIC ACTIVITIES: CPT | Mod: GP | Performed by: PHYSICAL THERAPIST

## 2022-06-17 PROCEDURE — 97110 THERAPEUTIC EXERCISES: CPT | Mod: GP | Performed by: PHYSICAL THERAPIST

## 2022-08-01 NOTE — PROGRESS NOTES
[facility]    Outpatient Physical Therapy Discharge Note  Patient: Edward Hinds  : 1980    Beginning/End Dates of Reporting Period:  22 to 22 Pt seen for 7 PT visits from  to 22 with cancel on 22 noting he needed to be seen closer to work in the [facility].    Referring Provider: Dr. Dudley    Therapy Diagnosis: Mechanical low back pain with radiating right lower extremity symptoms to the heel.     Client Self Report: Tape was a good reminder to keep neutral spine. PT has been helpful. Sx no longer constant in low back and LE. LE sx getting less frequent.    Pt called to cancel remaining PT at [place] noting that he had to be seen closer to his job in [place].     Objective Measurements:  Objective Measure: BRITTNEY/KsT (at eval medium Demi and 40% BRITTNEY)   Details: did not get follow up due to pt cancelling last visits but likely better.   Objective Measure: Frequency of PREP  Details: every 2 hours so 6-8 times per day  Objective Measure: Effect of PREP  Details: abolishes sx  Objective Measure: Symptom upon awakening  Details: only 1 morning in the past week had sx  Objective Measure: Frequency of distal symptoms.  Details: less, 50% of day LE sx     Goals:  Goal Identifier Symptoms   Goal Description Edward will use strategies learned in P.T. to decrease symptoms by 50% helping to reach personal goal of symptom relief.   Target Date 22   Date Met   22   Progress (detail required for progress note):  Pt noted overall sx reduction at 22 visit and LE and LBP no longer constant.     Goal Identifier Radiating symptoms   Goal Description Edward will no longer experience symptoms to the foot indicating centralization of symptoms.   Target Date 22   Date Met      Progress (detail required for progress note):  Less frequent at last visit on 22     Goal Identifier  Function   Goal Description Raunn will return to full symptom free trunk ROM, without lower extremity symptoms.   Target Date 08/15/22   Date Met      Progress (detail required for progress note) Pt will full extension without sx, not flexion.       Plan:  Discharge from therapy.    Discharge:    Reason for Discharge: pt needed to discontinue PT in [place] and get closer PT to work in [place]. Pt making good progress toward goals.    Equipment Issued: none    Discharge Plan: Patient to continue home program.

## 2023-01-04 ENCOUNTER — MYC MEDICAL ADVICE (OUTPATIENT)
Dept: NEUROSURGERY | Facility: CLINIC | Age: 43
End: 2023-01-04

## 2023-01-04 DIAGNOSIS — M54.16 LUMBAR RADICULOPATHY: Primary | ICD-10-CM

## 2023-01-07 ENCOUNTER — HOSPITAL ENCOUNTER (EMERGENCY)
Facility: CLINIC | Age: 43
Discharge: HOME | End: 2023-01-07
Attending: EMERGENCY MEDICINE | Admitting: EMERGENCY MEDICINE
Payer: COMMERCIAL

## 2023-01-07 VITALS
DIASTOLIC BLOOD PRESSURE: 91 MMHG | RESPIRATION RATE: 16 BRPM | SYSTOLIC BLOOD PRESSURE: 132 MMHG | OXYGEN SATURATION: 98 % | HEART RATE: 65 BPM | BODY MASS INDEX: 27.22 KG/M2 | TEMPERATURE: 98.3 F | HEIGHT: 72 IN | WEIGHT: 201 LBS

## 2023-01-07 DIAGNOSIS — M54.41 BILATERAL LOW BACK PAIN WITH RIGHT-SIDED SCIATICA, UNSPECIFIED CHRONICITY: ICD-10-CM

## 2023-01-07 PROCEDURE — 99284 EMERGENCY DEPT VISIT MOD MDM: CPT

## 2023-01-07 PROCEDURE — 99284 EMERGENCY DEPT VISIT MOD MDM: CPT | Performed by: EMERGENCY MEDICINE

## 2023-01-07 RX ORDER — OXYCODONE HYDROCHLORIDE 5 MG/1
5 TABLET ORAL EVERY 6 HOURS PRN
Qty: 6 TABLET | Refills: 0 | Status: SHIPPED | OUTPATIENT
Start: 2023-01-07

## 2023-01-07 RX ORDER — CYCLOBENZAPRINE HCL 10 MG
10 TABLET ORAL 3 TIMES DAILY PRN
Qty: 15 TABLET | Refills: 0 | Status: SHIPPED | OUTPATIENT
Start: 2023-01-07

## 2023-01-07 RX ORDER — METHYLPREDNISOLONE 4 MG
TABLET, DOSE PACK ORAL
Qty: 21 TABLET | Refills: 0 | Status: SHIPPED | OUTPATIENT
Start: 2023-01-07

## 2023-01-07 ASSESSMENT — ACTIVITIES OF DAILY LIVING (ADL): ADLS_ACUITY_SCORE: 37

## 2023-01-07 NOTE — LETTER
January 7, 2023      To Whom It May Concern:      Edward Hinds was seen in our Emergency Department today, 01/07/23.  I expect his condition to improve over the next 7 days.  He may return to work/school when improved.    Sincerely,        Tino Snyder MD

## 2023-01-07 NOTE — ED TRIAGE NOTES
Reports increased back pain past 7 days.      Triage Assessment     Row Name 01/07/23 8530       Triage Assessment (Adult)    Airway WDL WDL       Respiratory WDL    Respiratory WDL WDL       Skin Circulation/Temperature WDL    Skin Circulation/Temperature WDL WDL       Cardiac WDL    Cardiac WDL WDL       Peripheral/Neurovascular WDL    Peripheral Neurovascular WDL WDL       Cognitive/Neuro/Behavioral WDL    Cognitive/Neuro/Behavioral WDL WDL

## 2023-01-07 NOTE — ED PROVIDER NOTES
History     Chief Complaint   Patient presents with     Back Pain     HPI  Edward Hinds is a 42 year old male who presents to the er secondary to low back pain.  Intensity  -mod to severe with movement better with rest  Quality - dull ache in the back with occasional shooting pains down the right leg  Radiation - intermittently down the right leg  Timing - worse over the last week.  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   Associated symptoms - no incontinence, saddle anesthesia  Trauma? - no recent contributing trauma     Patient has a history of lumbar disc disease and right L4-5 and L5-S1 epidural steroid injection (5/13/22). he has been to Dr. Dudley and has gone to physical therapy.  He is getting set up to return to .  Lives in the area but commutes to [place].     5/2/202 lumbar mri:  IMPRESSION:  1. Multilevel lumbar spondylosis with large superiorly migrated right subarticular disc extrusion at L5-S1, which displaces and likely impinges both the descending right L5 and S1 nerve root segments.  2. Small right subarticular disc protrusion at L4-L5 displaces the descending right L5 nerve roots.  3. Moderate spinal canal stenosis at L5-S1. Mild to moderate spinal canal stenosis at L4-L5.  4. Mild to moderate left neural foraminal stenosis at L4-L5.  5. Mild bilateral neural foraminal stenosis L3-S1.    Allergies:  Allergies   Allergen Reactions     Penicillin [Penicillins]      Mother says penicillin         Problem List:    Patient Active Problem List    Diagnosis Date Noted     GERD (gastroesophageal reflux disease) 02/22/2013     Priority: Medium     CARDIOVASCULAR SCREENING; LDL GOAL LESS THAN 160 01/08/2013     Priority: Medium        Past Medical History:    History reviewed. No pertinent past medical history.    Past Surgical History:    History reviewed. No pertinent surgical history.    Family History:    Family History   Problem Relation Age of Onset     Hypertension  Father      Alzheimer Disease Maternal Grandmother      Heart Disease Maternal Grandfather        Social History:  Marital Status:   [2]  Social History     Tobacco Use     Smoking status: Former     Packs/day: 0.50     Years: 12.00     Pack years: 6.00     Types: Cigarettes     Start date: 12/21/2013     Smokeless tobacco: Never   Substance Use Topics     Alcohol use: Yes     Comment: 1-3/week     Drug use: No        Medications:    cyclobenzaprine (FLEXERIL) 10 MG tablet  methylPREDNISolone (MEDROL DOSEPAK) 4 MG tablet therapy pack  oxyCODONE (ROXICODONE) 5 MG tablet  ibuprofen (ADVIL/MOTRIN) 200 MG tablet          Review of Systems   All other systems reviewed and are negative.      Physical Exam   BP: (!) 152/101  Pulse: 79  Temp: 98.3  F (36.8  C)  Resp: 18  Height: 182.9 cm (6')  Weight: 91.2 kg (201 lb)  SpO2: 100 %      Physical Exam  Vitals and nursing note reviewed.   Constitutional:       General: He is not in acute distress.     Appearance: He is well-developed. He is not diaphoretic.   HENT:      Head: Normocephalic and atraumatic.      Nose: Nose normal. No congestion or rhinorrhea.      Mouth/Throat:      Mouth: Mucous membranes are moist.   Eyes:      General: No scleral icterus.     Extraocular Movements: Extraocular movements intact.      Conjunctiva/sclera: Conjunctivae normal.      Pupils: Pupils are equal, round, and reactive to light.   Cardiovascular:      Rate and Rhythm: Normal rate.   Pulmonary:      Effort: Pulmonary effort is normal.   Musculoskeletal:         General: Normal range of motion.      Cervical back: Normal range of motion and neck supple.      Right lower leg: No edema.      Left lower leg: No edema.      Comments: Straight leg raise: no increase in radicular symptoms - pain limits range of motion to less than 90 degrees    Skin:     General: Skin is warm and dry.      Findings: No rash.   Neurological:      General: No focal deficit present.      Mental Status: He is  alert and oriented to person, place, and time.   Psychiatric:         Mood and Affect: Mood normal.         ED Course                 Procedures             No results found for this or any previous visit (from the past 24 hour(s)).    Medications - No data to display    Assessments & Plan (with Medical Decision Making)  Low back pain, history of lumbar disc disease   Patient would like to try to avoid narcotics.  Mainly interested in getting imaging to find out what is going on.  Last mri was in may of 2022 prior to TALYA.  Pain has been fairly bad over the last week.  I discussed options with the patient and he would like to proceed with an outpatient mri, PT (which he will set up), medrol dose pack, small rx for oxycodone and flexeril which he may or may not use, follow up with Dr. Dudley.   The diagnosis, tx options, risks and follow up discussed.      I have reviewed the nursing notes.    I have reviewed the findings, diagnosis, plan and need for follow up with the patient.      Medical Decision Making  The patient presented with a problem that is a chronic illness mild to moderate exacerbation, progression, or side effect of treatment.    The patient's evaluation involved:  review of 2 prior external note(s) (see separate area of note for details)  ordering and review of 1 test(s) (see separate area of note for details)  review of 1 test result(s) ordered prior to this encounter (see separate area of note for details)    The patient's management involved prescription drug management.        Discharge Medication List as of 1/7/2023  7:10 PM      START taking these medications    Details   cyclobenzaprine (FLEXERIL) 10 MG tablet Take 1 tablet (10 mg) by mouth 3 times daily as needed for muscle spasms, Disp-15 tablet, R-0, [facility]             Final diagnoses:   Bilateral low back pain with right-sided sciatica, unspecified chronicity       1/7/2023   [facility] EMERGENCY DEPT     Tino Snyder  MD Bruce  01/07/23 7806

## 2023-01-08 NOTE — DISCHARGE INSTRUCTIONS
It was a pleasure to meet you.  I will try to stop in sometime at the school.  Use medications as needed.  The Medrol Dosepak might help with pain and inflammation and does not cause you to have lightheadedness or nausea or vomiting or constipation.  Start PT as planned.  An MRI was ordered.  Follow-up for the MRI and then see Dr. Dudley.  I hope you get better quickly.  I would recommend taking a few days off from the commute as this may help your back pain.

## 2023-01-14 ENCOUNTER — HEALTH MAINTENANCE LETTER (OUTPATIENT)
Age: 43
End: 2023-01-14

## 2023-01-19 ENCOUNTER — TELEPHONE (OUTPATIENT)
Dept: NEUROSURGERY | Facility: CLINIC | Age: 43
End: 2023-01-19
Payer: COMMERCIAL

## 2023-01-19 NOTE — TELEPHONE ENCOUNTER
Attempted x2 to reach out to patient, no answer. Left voice message for patient to call clinic back to further discuss.     Message sent to [employer] for appt clarification     Last Visit: 5/5/22    Next Visit: 1/26/23    Name of Provider:  Dr hoover    Assessment: Saw Dr hoover for right leg pain in may. Discussed InJ/PT vs surgery    Appt notes say follow up back pain and renew order for PT.  Called in on 1/4 for PT order  Per Luis Enrique, ok to reorder injection/PT     Called to clarify appt expectations.     MRI ordered 1/7/23: ?    Recommendation given: Complete the Lumbar MRI that was ordered by ED on 1/7/23  Reorder injection and cancel appt vs  Keep appt for surgical discussion     Action needed from provider: na

## 2023-02-01 ENCOUNTER — THERAPY VISIT (OUTPATIENT)
Dept: PHYSICAL THERAPY | Facility: CLINIC | Age: 43
End: 2023-02-01
Payer: COMMERCIAL

## 2023-02-01 DIAGNOSIS — M54.41 ACUTE RIGHT-SIDED LOW BACK PAIN WITH RIGHT-SIDED SCIATICA: ICD-10-CM

## 2023-02-01 PROCEDURE — 97161 PT EVAL LOW COMPLEX 20 MIN: CPT | Mod: GP | Performed by: PHYSICAL THERAPIST

## 2023-02-01 PROCEDURE — 97110 THERAPEUTIC EXERCISES: CPT | Mod: GP | Performed by: PHYSICAL THERAPIST

## 2023-02-01 NOTE — PROGRESS NOTES
Physical Therapy Initial Evaluation  Subjective:  Patient returns to outpatient PT with recurrent episode of low back pain (initial episode April 2022, current episode about a month ago).  Pain has reduced since visit to ED a month ago. Patient had been seeing PT in April/May and reports significant improvement in symptoms at the time.  Over time, decreased compliance with back care program, and then symptoms onset intensely a month ago.  Patient is back on back care routine and reports symptoms are significant improved.  Running is a favorite exercise activity, but hasn't been doing it for nearly a year due to pain.    The history is provided by the patient. No  was used.   Patient Health History  Edward Hinds being seen for PT for back.     Problem began: 4/1/2022.      Pain is reported as 1/10 on pain scale.  General health as reported by patient is fair.  Pertinent medical history includes: anemia.   Red flags:  None as reported by patient.   Other medical allergies details: penicillin.   Surgeries include:  None.    Current medications:  Anti-inflammatory. Other medications details: ibuprofen.    Current occupation is principal.   Primary job tasks include:  Lifting/carrying, prolonged sitting, prolonged standing and repetitive tasks.                  Therapist Generated HPI Evaluation         Type of problem:  Lumbar.    This is a recurrent condition.  Condition occurred with:  Insidious onset.  Where condition occurred: for unknown reasons.  Patient reports pain:  Lumbar spine right.  and is intermittent.  Pain radiates to:  Thigh right, lower leg right and foot right.   Since onset symptoms are gradually improving.  Associated symptoms:  Tingling and numbness. Symptoms are exacerbated by lifting, sitting and standing  and relieved by NSAID's and activity/movement.  Special tests included:  MRI.  Previous treatment includes physical therapy. There was significant improvement following  previous treatment.  Restrictions due to condition include:  Working in normal job without restrictions.  Barriers include:  None as reported by patient.                        Objective:  System         Lumbar/SI Evaluation    Lumbar Myotomes:    T12-L3 (Hip Flex):  Left: 5    Right: 5  L2-4 (Quads):  Left:  5    Right:  5  L4 (Ankle DF):  Left:  5    Right:  5    S1 (Toe Raise):  Left: 5    Right: 4        Neural Tension/Mobility:      Left side:Slump  negative.     Right side:   Slump  negative.   Lumbar Palpation:  Palpation (lumbar): tenderness to palpation at L5-S1.                                                           [place] Lumbar Evaluation    Posture:  Sitting: fair  Standing: good    Lateral Shift: no  Correction of Posture: better    Movement Loss:  Flexion (Flex): min  Extension (EXT): min  Side Glide R (SG R): nil  Side Glide L (SG L): nil  Test Movements:        EIL:   Repeat EIL: During: peripheralizing  After: no worse  Mechanical Response: no effect      Static Tests:          Lying Prone in Extension: prone lying -> prone lying in extension- abolish symptoms, better    Conclusion: derangement  Principle of Treatment:      Extension: return to static prone extension, every 2 hours                                           ROS    Assessment/Plan:    Patient is a 42 year old male with lumbar complaints.    Patient has the following significant findings with corresponding treatment plan.                Diagnosis 1:  Right low back pain with sciatica  Pain -  manual therapy, self management, education, directional preference exercise and home program  Decreased ROM/flexibility - manual therapy, therapeutic exercise and home program  Decreased strength - therapeutic exercise, therapeutic activities and home program  Impaired posture - neuro re-education and home program    Therapy Evaluation Codes:   1) History comprised of:   Personal factors that impact the plan of care:      None.     Comorbidity factors that impact the plan of care are:      None.     Medications impacting care: Anti-inflammatory.  2) Examination of Body Systems comprised of:   Body structures and functions that impact the plan of care:      Lumbar spine.   Activity limitations that impact the plan of care are:      Lifting, Reading/Computer work, Running, Sitting and Standing.  3) Clinical presentation characteristics are:   Stable/Uncomplicated.  4) Decision-Making    Low complexity using standardized patient assessment instrument and/or measureable assessment of functional outcome.  Cumulative Therapy Evaluation is: Low complexity.    Previous and current functional limitations:  (See Goal Flow Sheet for this information)    Short term and Long term goals: (See Goal Flow Sheet for this information)     Communication ability:  Patient appears to be able to clearly communicate and understand verbal and written communication and follow directions correctly.  Treatment Explanation - The following has been discussed with the patient:   RX ordered/plan of care  Anticipated outcomes  Possible risks and side effects  This patient would benefit from PT intervention to resume normal activities.   Rehab potential is good.    Frequency:  1 X week, once daily  Duration:  for 6-8 weeks  Discharge Plan:  Achieve all LTG.  Independent in home treatment program.  Reach maximal therapeutic benefit.    Please refer to the daily flowsheet for treatment today, total treatment time and time spent performing 1:1 timed codes.

## 2023-03-03 ENCOUNTER — THERAPY VISIT (OUTPATIENT)
Dept: PHYSICAL THERAPY | Facility: CLINIC | Age: 43
End: 2023-03-03
Attending: NEUROLOGICAL SURGERY
Payer: COMMERCIAL

## 2023-03-03 DIAGNOSIS — M54.41 ACUTE RIGHT-SIDED LOW BACK PAIN WITH RIGHT-SIDED SCIATICA: Primary | ICD-10-CM

## 2023-03-03 PROCEDURE — 97110 THERAPEUTIC EXERCISES: CPT | Mod: GP | Performed by: PHYSICAL THERAPIST

## 2023-03-10 ENCOUNTER — THERAPY VISIT (OUTPATIENT)
Dept: PHYSICAL THERAPY | Facility: CLINIC | Age: 43
End: 2023-03-10
Attending: NEUROLOGICAL SURGERY
Payer: COMMERCIAL

## 2023-03-10 DIAGNOSIS — M54.41 ACUTE RIGHT-SIDED LOW BACK PAIN WITH RIGHT-SIDED SCIATICA: Primary | ICD-10-CM

## 2023-03-10 PROCEDURE — 97140 MANUAL THERAPY 1/> REGIONS: CPT | Mod: GP | Performed by: PHYSICAL THERAPIST

## 2023-03-10 PROCEDURE — 97110 THERAPEUTIC EXERCISES: CPT | Mod: GP | Performed by: PHYSICAL THERAPIST

## 2023-05-24 ENCOUNTER — ANCILLARY ORDERS (OUTPATIENT)
Dept: MRI IMAGING | Facility: CLINIC | Age: 43
End: 2023-05-24

## 2023-05-24 DIAGNOSIS — M54.16 LUMBAR RADICULOPATHY: ICD-10-CM

## 2023-06-02 ENCOUNTER — HEALTH MAINTENANCE LETTER (OUTPATIENT)
Age: 43
End: 2023-06-02

## 2023-06-29 ENCOUNTER — HOSPITAL ENCOUNTER (OUTPATIENT)
Dept: MRI IMAGING | Facility: CLINIC | Age: 43
Discharge: HOME | End: 2023-06-29
Payer: COMMERCIAL

## 2023-06-29 DIAGNOSIS — M54.16 LUMBAR RADICULOPATHY: ICD-10-CM

## 2023-06-29 PROCEDURE — 72148 MRI LUMBAR SPINE W/O DYE: CPT

## 2024-06-30 ENCOUNTER — HEALTH MAINTENANCE LETTER (OUTPATIENT)
Age: 44
End: 2024-06-30

## 2024-12-19 ENCOUNTER — TRANSFERRED RECORDS (OUTPATIENT)
Dept: HEALTH INFORMATION MANAGEMENT | Facility: CLINIC | Age: 44
End: 2024-12-19
Payer: COMMERCIAL

## 2024-12-22 ENCOUNTER — HOSPITAL ENCOUNTER (EMERGENCY)
Facility: CLINIC | Age: 44
Discharge: HOME | End: 2024-12-22
Attending: PHYSICIAN ASSISTANT | Admitting: PHYSICIAN ASSISTANT
Payer: COMMERCIAL

## 2024-12-22 ENCOUNTER — APPOINTMENT (OUTPATIENT)
Dept: ULTRASOUND IMAGING | Facility: CLINIC | Age: 44
End: 2024-12-22
Attending: PHYSICIAN ASSISTANT
Payer: COMMERCIAL

## 2024-12-22 VITALS
HEIGHT: 72 IN | DIASTOLIC BLOOD PRESSURE: 103 MMHG | RESPIRATION RATE: 18 BRPM | TEMPERATURE: 97.7 F | WEIGHT: 186 LBS | HEART RATE: 66 BPM | BODY MASS INDEX: 25.19 KG/M2 | OXYGEN SATURATION: 99 % | SYSTOLIC BLOOD PRESSURE: 138 MMHG

## 2024-12-22 DIAGNOSIS — M79.652 PAIN OF LEFT THIGH: ICD-10-CM

## 2024-12-22 PROCEDURE — 99283 EMERGENCY DEPT VISIT LOW MDM: CPT | Performed by: PHYSICIAN ASSISTANT

## 2024-12-22 PROCEDURE — 99284 EMERGENCY DEPT VISIT MOD MDM: CPT | Mod: 25 | Performed by: PHYSICIAN ASSISTANT

## 2024-12-22 PROCEDURE — 93971 EXTREMITY STUDY: CPT | Mod: LT

## 2024-12-22 PROCEDURE — 250N000013 HC RX MED GY IP 250 OP 250 PS 637: Performed by: PHYSICIAN ASSISTANT

## 2024-12-22 RX ORDER — ACETAMINOPHEN 325 MG/1
975 TABLET ORAL ONCE
Status: COMPLETED | OUTPATIENT
Start: 2024-12-22 | End: 2024-12-22

## 2024-12-22 RX ORDER — HYDROCODONE BITARTRATE AND ACETAMINOPHEN 5; 325 MG/1; MG/1
.5-1 TABLET ORAL EVERY 6 HOURS PRN
Qty: 6 TABLET | Refills: 0 | Status: SHIPPED | OUTPATIENT
Start: 2024-12-22 | End: 2024-12-26

## 2024-12-22 RX ORDER — IBUPROFEN 600 MG/1
600 TABLET, FILM COATED ORAL ONCE
Status: COMPLETED | OUTPATIENT
Start: 2024-12-22 | End: 2024-12-22

## 2024-12-22 RX ADMIN — ACETAMINOPHEN 975 MG: 325 TABLET, FILM COATED ORAL at 20:26

## 2024-12-22 RX ADMIN — IBUPROFEN 600 MG: 600 TABLET, FILM COATED ORAL at 20:26

## 2024-12-22 ASSESSMENT — COLUMBIA-SUICIDE SEVERITY RATING SCALE - C-SSRS
2. HAVE YOU ACTUALLY HAD ANY THOUGHTS OF KILLING YOURSELF IN THE PAST MONTH?: NO
6. HAVE YOU EVER DONE ANYTHING, STARTED TO DO ANYTHING, OR PREPARED TO DO ANYTHING TO END YOUR LIFE?: NO
1. IN THE PAST MONTH, HAVE YOU WISHED YOU WERE DEAD OR WISHED YOU COULD GO TO SLEEP AND NOT WAKE UP?: NO

## 2024-12-22 ASSESSMENT — ACTIVITIES OF DAILY LIVING (ADL)
ADLS_ACUITY_SCORE: 41

## 2024-12-23 NOTE — DISCHARGE INSTRUCTIONS
It was a pleasure working with you today!  I hope your condition improves rapidly!     Please rest your leg!  Use the knee immobilizer to keep your knee straight.  This will help relieve some pressure and tension off of your hamstring.  Use the crutches to avoid causing any further pain with your leg.  Use heat over the painful area for 20 minutes every couple hours.  It is okay to use ibuprofen 600 mg every 6 hours as needed for pain.  You can also use Tylenol 650 mg every 6 hours as needed for pain on top of the ibuprofen.  Reserve the hydrocodone for severe breakthrough pain.  Do not drive or operate machinery for 8 hours after taking hydrocodone, as this medication can impair your judgment.  The orthopedic department should be contacting you tomorrow to line up a follow-up appointment this week to recheck your condition.  I am concerned you could have had a hamstring injury, although your symptoms also can occur from irritating a disc in your back.

## 2024-12-23 NOTE — ED PROVIDER NOTES
History     Chief Complaint   Patient presents with    Leg Pain     HPI  Edward Hinds is a 43 year old male who presents for evaluation of left posterior thigh discomfort increasing over the past 3 weeks to the point that he cannot do any prolonged sitting or standing.  He does activities like that his pain is upwards of 8 on a scale of 10.  It is a sharp pain when he tries to use the leg.  Denies any numbness or tingling.  No weakness.  No saddle anesthesia or loss of bowel/bladder function.  Initial injury was 3 weeks ago when he was standing on a ladder putting bikes up in his garage ceiling area.  He gave a bike an extra push and he lost his balance.  He was able to regain his balance and stay on the ladder without a fall but he started to have the discomfort after this.  Pain has been much worse in the past 4 days.  He describes it as a sharp pulsating pain.  Was seen in urgent care 4 days ago and had a negative x-ray of his knee.  They placed him on prednisone and he has taken 4 days of this.  No significant improvement.  He does not have any active back pain currently.  Does have a history of lumbar radiculopathy which was on the right side requiring LESI and PT intervention but no surgery.  The pain is different this time.            Narrative & Impression   EXAM: MR LUMBAR SPINE W/O CONTRAST  LOCATION: [facility]  DATE/TIME: 5/2/2022 5:30 PM     INDICATION: Low back pain, cauda equina syndrome suspected.  COMPARISON: None.  TECHNIQUE: Routine Lumbar Spine MRI without IV contrast.     FINDINGS:   Nomenclature is based on 5 lumbar type vertebral bodies. Normal vertebral body heights, alignment and marrow signal. No fracture, destructive bone lesion or infection. Normal distal spinal cord and cauda equina with conus medullaris at T12-L1. No   extraspinal abnormality. Unremarkable visualized bony pelvis.     Findings on a level by level basis are as follows:     T12-L1:  Normal disc height and signal. No herniation. Normal facets. No spinal canal or neural foraminal stenosis.      L1-L2: Normal disc height and signal. No herniation. Normal facets. No spinal canal or neural foraminal stenosis.     L2-L3: Normal disc height and signal. No herniation. Normal facets. No spinal canal or neural foraminal stenosis.      L3-L4: Normal disc height and signal. Shallow disc bulge. No disc herniation. Mild facet arthrosis and ligamentum flavum thickening. Mild bilateral neural foraminal stenosis. Mild spinal canal stenosis.     L4-L5: Mild disc height loss. Loss of disc signal. Disc bulge with superimposed 5 mm AP dimension right subarticular inferiorly migrated disc extrusion, which partially effaces the right subarticular zone and displaces the descending right L5 nerve   roots. Facet arthrosis and ligamentum flavum thickening. Mild bilateral neural foraminal stenosis. Mild to moderate spinal canal stenosis.     L5-S1: Moderate disc height loss. Loss of disc signal. Disc bulge with superimposed 10 mm AP dimension by 24 mm craniocaudal dimension superiorly migrated right subarticular disc extrusion, which displaces the descending right L5 nerve roots within the   right L5 lateral recess and right S1 nerve roots in the subarticular zone. Facet arthrosis. Mild bilateral neural foraminal stenosis. Moderate spinal canal stenosis.                                                                      IMPRESSION:  1. Multilevel lumbar spondylosis with large superiorly migrated right subarticular disc extrusion at L5-S1, which displaces and likely impinges both the descending right L5 and S1 nerve root segments.  2. Small right subarticular disc protrusion at L4-L5 displaces the descending right L5 nerve roots.  3. Moderate spinal canal stenosis at L5-S1. Mild to moderate spinal canal stenosis at L4-L5.  4. Mild to moderate left neural foraminal stenosis at L4-L5.  5. Mild bilateral neural foraminal  stenosis L3-S1.           Allergies:  Allergies   Allergen Reactions    Penicillin [Penicillins]      Mother says penicillin         Problem List:    Patient Active Problem List    Diagnosis Date Noted    Acute right-sided low back pain with right-sided sciatica 02/01/2023     Priority: Medium    GERD (gastroesophageal reflux disease) 02/22/2013     Priority: Medium    CARDIOVASCULAR SCREENING; LDL GOAL LESS THAN 160 01/08/2013     Priority: Medium        Past Medical History:    History reviewed. No pertinent past medical history.    Past Surgical History:    History reviewed. No pertinent surgical history.    Family History:    Family History   Problem Relation Age of Onset    Hypertension Father     Alzheimer Disease Maternal Grandmother     Heart Disease Maternal Grandfather        Social History:  Marital Status:   [2]  Social History     Tobacco Use    Smoking status: Former     Current packs/day: 0.50     Average packs/day: 0.5 packs/day for 12.0 years (6.0 ttl pk-yrs)     Types: Cigarettes     Start date: 12/21/2013    Smokeless tobacco: Never   Substance Use Topics    Alcohol use: Yes     Comment: rare    Drug use: No        Medications:    cyclobenzaprine (FLEXERIL) 10 MG tablet  HYDROcodone-acetaminophen (NORCO) 5-325 MG tablet  ibuprofen (ADVIL/MOTRIN) 200 MG tablet  methylPREDNISolone (MEDROL DOSEPAK) 4 MG tablet therapy pack  oxyCODONE (ROXICODONE) 5 MG tablet          Review of Systems   All other systems reviewed and are negative.      Physical Exam   BP: (!) 138/103  Pulse: 66  Temp: 97.7  F (36.5  C)  Resp: 18  Height: 182.9 cm (6')  Weight: 84.4 kg (186 lb)  SpO2: 99 %      Physical Exam  Vitals and nursing note reviewed.   Constitutional:       General: He is not in acute distress.     Appearance: He is not diaphoretic.   HENT:      Head: Normocephalic and atraumatic.      Right Ear: External ear normal.      Left Ear: External ear normal.      Nose: Nose normal.      Mouth/Throat:       Pharynx: No oropharyngeal exudate.   Eyes:      General: No scleral icterus.        Right eye: No discharge.         Left eye: No discharge.      Conjunctiva/sclera: Conjunctivae normal.      Pupils: Pupils are equal, round, and reactive to light.   Neck:      Thyroid: No thyromegaly.   Cardiovascular:      Rate and Rhythm: Normal rate and regular rhythm.      Heart sounds: Normal heart sounds. No murmur heard.  Pulmonary:      Effort: Pulmonary effort is normal. No respiratory distress.      Breath sounds: Normal breath sounds. No wheezing or rales.   Chest:      Chest wall: No tenderness.   Abdominal:      General: Bowel sounds are normal. There is no distension.      Palpations: Abdomen is soft. There is no mass.      Tenderness: There is no abdominal tenderness. There is no guarding or rebound.   Musculoskeletal:         General: Tenderness present. No swelling, deformity or signs of injury. Normal range of motion.      Cervical back: Normal range of motion and neck supple.      Right lower leg: No edema.      Left lower leg: No edema.      Comments: Tenderness of the left mid/distal thigh and hamstring area down to the level of the posterior knee.  No acute swelling.  No pulsations.  No palpable cord.  Negative Homans' sign.  No calf tenderness or swelling.  Increased pain with use of the hamstring but he also has positive SLR at about 45 degrees on the left.  Hip with normal range of motion.  No pain with abduction of the hip.  No tenderness to palpation around the pelvis.  Low back exam normal without step-off, bruising, redness.  He does not have any lumbar tenderness to palpation.  Normal range of motion of the low back.  Lower extremity sensation, pulses, and DTRs all normal.   Lymphadenopathy:      Cervical: No cervical adenopathy.   Skin:     General: Skin is warm and dry.      Capillary Refill: Capillary refill takes less than 2 seconds.      Findings: No erythema or rash.   Neurological:      Mental  Status: He is alert and oriented to person, place, and time.      Cranial Nerves: No cranial nerve deficit.   Psychiatric:         Behavior: Behavior normal.         Thought Content: Thought content normal.         ED Course        Procedures              Critical Care time:  none              Results for orders placed or performed during the hospital encounter of 12/22/24 (from the past 24 hours)   US Lower Extremity Venous Duplex Left    Narrative    EXAM: US LOWER EXTREMITY VENOUS DUPLEX LEFT  LOCATION: [facility]  DATE: 12/22/2024    INDICATION: left posterior knee distal thigh pain swelling  COMPARISON: None.  TECHNIQUE: Venous Duplex ultrasound of the left lower extremity with and without compression, augmentation and duplex. Color flow and spectral Doppler with waveform analysis performed.    FINDINGS: Exam includes the common femoral, femoral, popliteal, and contralateral common femoral veins as well as segmentally visualized deep calf veins and greater saphenous vein.     LEFT: No deep vein thrombosis. No superficial thrombophlebitis. No popliteal cyst.      Impression    IMPRESSION:  1.  No deep venous thrombosis in the left lower extremity.       Medications   ibuprofen (ADVIL/MOTRIN) tablet 600 mg (600 mg Oral $Given 12/22/24 2026)   acetaminophen (TYLENOL) tablet 975 mg (975 mg Oral $Given 12/22/24 2026)       Assessments & Plan (with Medical Decision Making)  Pain of left thigh     43 year old male presents with increasing left posterior distal thigh pain over the past 3 weeks.  See HPI per above.  Had a negative x-ray at urgent care of the knee 4 days ago and placed on prednisone without improvement.  BP (!) 138/103   Pulse 66   Temp 97.7  F (36.5  C) (Temporal)   Resp 18   Ht 1.829 m (6')   Wt 84.4 kg (186 lb)   SpO2 99%   BMI 25.23 kg/m     No acute swelling noted.  Tenderness of the posterior thigh and posterior knee area without palpable cord.  Negative  Homans' sign.  SLR positive at 45 degrees.  Sensation, pulses, strength, DTRs all intact.  Hip with normal range of motion.  No tenderness of the back.  No other exam abnormalities.  Lower extremity ultrasound negative for DVT or Baker's cyst.  Discussed the differential with the patient.  Concerned about hamstring tear/pull versus lumbar radiculopathy versus other.  He is having increasing pain such that is difficult to do any activities.  Evening having a hard time sleeping.  Therefore, I am going to immobilize the knee to prevent any hamstring motion utilizing a knee immobilizer.  We also dispense crutches to be nonweightbearing until he is able to do so without any pain.  Encouraged using heat over the painful area for 20 minutes every couple hours.  Proper dosing for ibuprofen and Tylenol discussed.  I did prescribe hydrocodone for severe breakthrough pain.  Orthopedic  referral placed for follow-up hopefully this upcoming week to reevaluate his status.  They can determine the next step pending his progress.  He may need advanced imaging if not showing improvement with the above noted regimen.  ED return instructions reviewed with the patient in detail.  He was in agreement.     I have reviewed the nursing notes.    I have reviewed the findings, diagnosis, plan and need for follow up with the patient.           Medical Decision Making  The patient's presentation was of moderate complexity (an acute complicated injury).    The patient's evaluation involved:  ordering and/or review of 1 test(s) in this encounter (see separate area of note for details)    The patient's management necessitated moderate risk (prescription drug management including medications given in the ED).        Discharge Medication List as of 12/22/2024 10:40 PM        START taking these medications    Details   HYDROcodone-acetaminophen (NORCO) 5-325 MG tablet Take 0.5-1 tablets by mouth every 6 hours as needed for severe pain.,  Disp-6 tablet, R-0, [facility]             Final diagnoses:   Pain of left thigh     Disclaimer: This note consists of symbols derived from keyboarding, dictation and/or voice recognition software. As a result, there may be errors in the script that have gone undetected. Please consider this when interpreting information found in this chart.      12/22/2024   [facility] EMERGENCY DEPT       Donato Berumen PA-C  12/22/24 [id number]

## 2024-12-23 NOTE — ED TRIAGE NOTES
Presents with pain to left leg. States he has been having pain for the last two weeks but it has progressed. Denies injury-states he may have tweaked it when he was putting his kids bikes up in the garage. States it feels like nerve pain

## 2024-12-26 ENCOUNTER — MEDICAL CORRESPONDENCE (OUTPATIENT)
Dept: HEALTH INFORMATION MANAGEMENT | Facility: CLINIC | Age: 44
End: 2024-12-26

## 2024-12-26 ENCOUNTER — TRANSFERRED RECORDS (OUTPATIENT)
Dept: HEALTH INFORMATION MANAGEMENT | Facility: CLINIC | Age: 44
End: 2024-12-26

## 2024-12-26 ENCOUNTER — ANCILLARY PROCEDURE (OUTPATIENT)
Dept: GENERAL RADIOLOGY | Facility: CLINIC | Age: 44
End: 2024-12-26
Attending: PEDIATRICS
Payer: COMMERCIAL

## 2024-12-26 ENCOUNTER — OFFICE VISIT (OUTPATIENT)
Dept: ORTHOPEDICS | Facility: CLINIC | Age: 44
End: 2024-12-26
Payer: COMMERCIAL

## 2024-12-26 DIAGNOSIS — M54.16 LUMBAR RADICULOPATHY: Primary | ICD-10-CM

## 2024-12-26 DIAGNOSIS — M79.652 PAIN OF LEFT THIGH: ICD-10-CM

## 2024-12-26 RX ORDER — HYDROCODONE BITARTRATE AND ACETAMINOPHEN 5; 325 MG/1; MG/1
1 TABLET ORAL EVERY 8 HOURS PRN
Qty: 12 TABLET | Refills: 0 | Status: SHIPPED | OUTPATIENT
Start: 2024-12-26

## 2024-12-26 RX ORDER — NAPROXEN 500 MG/1
500 TABLET ORAL 2 TIMES DAILY PRN
Qty: 30 TABLET | Refills: 30 | Status: SHIPPED | OUTPATIENT
Start: 2024-12-26

## 2024-12-26 RX ORDER — CYCLOBENZAPRINE HCL 5 MG
5-10 TABLET ORAL 3 TIMES DAILY PRN
Qty: 45 TABLET | Refills: 0 | Status: SHIPPED | OUTPATIENT
Start: 2024-12-26

## 2024-12-26 NOTE — LETTER
12/26/2024      Edward Hinds  [address] [facility] [address]      Dear Colleague,    Thank you for referring your patient, Edward Hinds, to the [facility]. Please see a copy of my visit note below.    ASSESSMENT & PLAN    Edward was seen today for pain and injury.    Diagnoses and all orders for this visit:    Lumbar radiculopathy  -     naproxen (NAPROSYN) 500 MG tablet; Take 1 tablet (500 mg) by mouth 2 times daily as needed for moderate pain.  -     cyclobenzaprine (FLEXERIL) 5 MG tablet; Take 1-2 tablets (5-10 mg) by mouth 3 times daily as needed for muscle spasms.  -     MRI Lumbar spine w/o contrast; Future  -     Physical Therapy  Referral; Future  -     HYDROcodone-acetaminophen (NORCO) 5-325 MG tablet; Take 1 tablet by mouth every 8 hours as needed for severe pain.    Pain of left thigh  -     Orthopedic  Referral  -     XR Knee Bilateral 3 Views; Future  -     XR Femur Right 2 Views; Future  -     XR Lumbar Spine 2/3 Views  -     naproxen (NAPROSYN) 500 MG tablet; Take 1 tablet (500 mg) by mouth 2 times daily as needed for moderate pain.  -     cyclobenzaprine (FLEXERIL) 5 MG tablet; Take 1-2 tablets (5-10 mg) by mouth 3 times daily as needed for muscle spasms.  -     MRI Lumbar spine w/o contrast; Future  -     Physical Therapy  Referral; Future  -     HYDROcodone-acetaminophen (NORCO) 5-325 MG tablet; Take 1 tablet by mouth every 8 hours as needed for severe pain.      This issue is acute on chronic and Worsening.        ICD-10-CM    1. Lumbar radiculopathy  M54.16 naproxen (NAPROSYN) 500 MG tablet     cyclobenzaprine (FLEXERIL) 5 MG tablet     MRI Lumbar spine w/o contrast     Physical Therapy  Referral     HYDROcodone-acetaminophen (NORCO) 5-325 MG tablet      2. Pain of left thigh  M79.652 Orthopedic  Referral     XR Knee Bilateral 3 Views     XR Femur Right 2 Views     XR Lumbar Spine 2/3 Views     naproxen  (NAPROSYN) 500 MG tablet     cyclobenzaprine (FLEXERIL) 5 MG tablet     MRI Lumbar spine w/o contrast     Physical Therapy  Referral     HYDROcodone-acetaminophen (NORCO) 5-325 MG tablet          We discussed these other possible diagnosis: Symptoms more likely lumbar radiculopathy given history and exam. Given severity, will obtain MRI, close follow up.    Plan:  - Today's Plan of Care:  MRI of the Lumbar Spine - Call [phone] to schedule MRI     Reviewed medication options:  Naproxen prescribed (STOP ibuprofen)  Cyclobenzaprine prescribed for use as needed, has tolerated in the past  Hydrocodone-acetaminophen refilled for breakthrough pain, PDMP reviewed.  (I reviewed the mechanism of action as well as risk/benefit profile of medications. Questions answered.)    Referred to Physical Therapy    Work Letter Given    Discussed activity considerations and other supportive care including Ice/Heat, and other topical medications as needed.    -We also discussed other future treatment options:  Referral to Spine  Referred for TALYA    Follow Up: ~ 1 week, virtual visit    Concerning signs and symptoms were reviewed and all questions were answered at this time.    Edilia Vallceillo MD [facility]  Sports Medicine Physician  [facility]    -----  Chief Complaint   Patient presents with     Left Knee - Pain, Injury       SUBJECTIVE  Edward Hinds is a/an 44 year old male who is seen as an ER referral for evaluation of left high pain.    The patient is seen with their family .    Onset: 3 weeks ago pain started. Has had an additional flare up, worsening since 12/22/24. Patient describes injury as while on a ladder, tried to push up a bike into a rafter with a ladder.  He lost his balance.  Did not fall. Not sure if his leg was stretched or twisted.  - Recently it has worsened to the point he cannot function in his ADLs.       Location of Pain: left posterior thigh pain, left posterior knee.  Originated in  the middle, posterior thigh.     Worsened by: walking, moving, sitting, laying down, sleeping  Better with: laying down, laying on stomach   Treatments tried: ibuprofen, other medications: Hydrocodone/Acetaminophen (Vicodin/Norco), and wearing immobilizer, previous imaging (US for DVT 12/22/24)  Associated symptoms: weakness of left knee/left thigh  and sharp pain constant, radiating from the knee up   - Went to [facility] last week had prednisone which didn't help  - Presented to the ED earlier this week, had US and recommended to use knee immobilizer    Orthopedic/Surgical history: Yes, has had herniated disc in L5 in the past, other leg  Social History/Occupation: on break, he is an        REVIEW OF SYSTEMS:  Review of Systems    OBJECTIVE:  There were no vitals taken for this visit.   General: healthy, alert and in no distress  Skin: no suspicious lesions or rash.  CV: distal perfusion intact   Resp: normal respiratory effort without conversational dyspnea   Psych: normal mood and affect  Gait: NORMAL  Neuro: Normal light sensory exam of lower extremity    Low back exam:  Inspection:     no visible deformity in the low back       normal skin       normal vascular       normal lymphatic       no asymmetry    Posture:      abnormal lumbar curve, likely due to pain    Tender:     none    Non Tender:     remainder of lumbar spine    ROM:      limited flexion due to pain into leg       limited extension due to pain    Strength:     hip flexion 5/5 bilateral       knee extension 5/5 bilateral       ankle dorsiflexion 5/5 bilateral       ankle plantarflexion 5/5 bilateral    Sensation:    grossly intact throughout lower extremities    Special tests:      straight leg raise left positive        straight leg raise right negative       neg (-) ANGIE  bilateral       neg (-) FADIR  bilateral    Other Leg Exam:  - no tenderness knee, hip or hamstring  - no pain with hip and knee ROM  - normal knee exam, negative  Johnathan, stable to varus and valgus stress, anterior and posterior drawer       RADIOLOGY:  Final results and radiologist's interpretation, available in the [facility] health record.  Images were reviewed with the patient in the office today.  My personal interpretation of the performed imaging:     3 XR views of lumbar spine reviewed: no acute bony abnormality, lower lumbar degenerative changes  - will follow official read    2 XR views of left femur reviewed: no acute bony abnormality, no significant degenerative change  - will follow official read    AP and sunrise bilateral and left lateral XR views of knees reviewed: no acute bony abnormality, no significant degenerative change  - will follow official read    Reviewed normal US 12/22/2024    Reviewed Lumbar MRI from 6/29/2023 - disc bulge lower lumbar spine L4-L5, L5-S1    Review of prior external note(s) from - ER   Review of the result(s) of each unique test - XRs, US, MRI  40 minutes spent by me on the date of the encounter doing chart review, history and exam, documentation and further activities per the note           Again, thank you for allowing me to participate in the care of your patient.        Sincerely,        Edilia Vallecillo MD    Electronically signed

## 2024-12-26 NOTE — PROGRESS NOTES
ASSESSMENT & PLAN    Edward was seen today for pain and injury.    Diagnoses and all orders for this visit:    Lumbar radiculopathy  -     naproxen (NAPROSYN) 500 MG tablet; Take 1 tablet (500 mg) by mouth 2 times daily as needed for moderate pain.  -     cyclobenzaprine (FLEXERIL) 5 MG tablet; Take 1-2 tablets (5-10 mg) by mouth 3 times daily as needed for muscle spasms.  -     MRI Lumbar spine w/o contrast; Future  -     Physical Therapy  Referral; Future  -     HYDROcodone-acetaminophen (NORCO) 5-325 MG tablet; Take 1 tablet by mouth every 8 hours as needed for severe pain.    Pain of left thigh  -     Orthopedic  Referral  -     XR Knee Bilateral 3 Views; Future  -     XR Femur Right 2 Views; Future  -     XR Lumbar Spine 2/3 Views  -     naproxen (NAPROSYN) 500 MG tablet; Take 1 tablet (500 mg) by mouth 2 times daily as needed for moderate pain.  -     cyclobenzaprine (FLEXERIL) 5 MG tablet; Take 1-2 tablets (5-10 mg) by mouth 3 times daily as needed for muscle spasms.  -     MRI Lumbar spine w/o contrast; Future  -     Physical Therapy  Referral; Future  -     HYDROcodone-acetaminophen (NORCO) 5-325 MG tablet; Take 1 tablet by mouth every 8 hours as needed for severe pain.      This issue is acute on chronic and Worsening.        ICD-10-CM    1. Lumbar radiculopathy  M54.16 naproxen (NAPROSYN) 500 MG tablet     cyclobenzaprine (FLEXERIL) 5 MG tablet     MRI Lumbar spine w/o contrast     Physical Therapy  Referral     HYDROcodone-acetaminophen (NORCO) 5-325 MG tablet      2. Pain of left thigh  M79.652 Orthopedic  Referral     XR Knee Bilateral 3 Views     XR Femur Right 2 Views     XR Lumbar Spine 2/3 Views     naproxen (NAPROSYN) 500 MG tablet     cyclobenzaprine (FLEXERIL) 5 MG tablet     MRI Lumbar spine w/o contrast     Physical Therapy  Referral     HYDROcodone-acetaminophen (NORCO) 5-325 MG tablet          We discussed these other possible diagnosis:  Symptoms more likely lumbar radiculopathy given history and exam. Given severity, will obtain MRI, close follow up.    Plan:  - Today's Plan of Care:  MRI of the Lumbar Spine - Call [phone] to schedule MRI     Reviewed medication options:  Naproxen prescribed (STOP ibuprofen)  Cyclobenzaprine prescribed for use as needed, has tolerated in the past  Hydrocodone-acetaminophen refilled for breakthrough pain, PDMP reviewed.  (I reviewed the mechanism of action as well as risk/benefit profile of medications. Questions answered.)    Referred to Physical Therapy    Work Letter Given    Discussed activity considerations and other supportive care including Ice/Heat, and other topical medications as needed.    -We also discussed other future treatment options:  Referral to Spine  Referred for TALYA    Follow Up: ~ 1 week, virtual visit    Concerning signs and symptoms were reviewed and all questions were answered at this time.    Edilia Vallecillo MD [facility]  Sports Medicine Physician  [facility]    -----  Chief Complaint   Patient presents with    Left Knee - Pain, Injury       SUBJECTIVE  Edward Hinds is a/an 44 year old male who is seen as an ER referral for evaluation of left high pain.    The patient is seen with their family .    Onset: 3 weeks ago pain started. Has had an additional flare up, worsening since 12/22/24. Patient describes injury as while on a ladder, tried to push up a bike into a rafter with a ladder.  He lost his balance.  Did not fall. Not sure if his leg was stretched or twisted.  - Recently it has worsened to the point he cannot function in his ADLs.       Location of Pain: left posterior thigh pain, left posterior knee.  Originated in the middle, posterior thigh.     Worsened by: walking, moving, sitting, laying down, sleeping  Better with: laying down, laying on stomach   Treatments tried: ibuprofen, other medications: Hydrocodone/Acetaminophen (Vicodin/Norco), and wearing  immobilizer, previous imaging (US for DVT 12/22/24)  Associated symptoms: weakness of left knee/left thigh  and sharp pain constant, radiating from the knee up   - Went to [facility] last week had prednisone which didn't help  - Presented to the ED earlier this week, had US and recommended to use knee immobilizer    Orthopedic/Surgical history: Yes, has had herniated disc in L5 in the past, other leg  Social History/Occupation: on break, he is an        REVIEW OF SYSTEMS:  Review of Systems    OBJECTIVE:  There were no vitals taken for this visit.   General: healthy, alert and in no distress  Skin: no suspicious lesions or rash.  CV: distal perfusion intact   Resp: normal respiratory effort without conversational dyspnea   Psych: normal mood and affect  Gait: NORMAL  Neuro: Normal light sensory exam of lower extremity    Low back exam:  Inspection:     no visible deformity in the low back       normal skin       normal vascular       normal lymphatic       no asymmetry    Posture:      abnormal lumbar curve, likely due to pain    Tender:     none    Non Tender:     remainder of lumbar spine    ROM:      limited flexion due to pain into leg       limited extension due to pain    Strength:     hip flexion 5/5 bilateral       knee extension 5/5 bilateral       ankle dorsiflexion 5/5 bilateral       ankle plantarflexion 5/5 bilateral    Sensation:    grossly intact throughout lower extremities    Special tests:      straight leg raise left positive        straight leg raise right negative       neg (-) ANGIE  bilateral       neg (-) FADIR  bilateral    Other Leg Exam:  - no tenderness knee, hip or hamstring  - no pain with hip and knee ROM  - normal knee exam, negative Johnathan, stable to varus and valgus stress, anterior and posterior drawer       RADIOLOGY:  Final results and radiologist's interpretation, available in the [facility] health record.  Images were reviewed with the patient in the office today.  My  personal interpretation of the performed imaging:     3 XR views of lumbar spine reviewed: no acute bony abnormality, lower lumbar degenerative changes  - will follow official read    2 XR views of left femur reviewed: no acute bony abnormality, no significant degenerative change  - will follow official read    AP and sunrise bilateral and left lateral XR views of knees reviewed: no acute bony abnormality, no significant degenerative change  - will follow official read    Reviewed normal US 12/22/2024    Reviewed Lumbar MRI from 6/29/2023 - disc bulge lower lumbar spine L4-L5, L5-S1    Review of prior external note(s) from - ER   Review of the result(s) of each unique test - XRs, US, MRI  40 minutes spent by me on the date of the encounter doing chart review, history and exam, documentation and further activities per the note

## 2024-12-26 NOTE — PATIENT INSTRUCTIONS
We discussed these other possible diagnosis: Symptoms more likely lumbar radiculopathy given history and exam. Given severity, will obtain MRI, close follow up.    Plan:  - Today's Plan of Care:  MRI of the Lumbar Spine - Call [phone] to schedule MRI     Reviewed medication options:  Naproxen prescribed (STOP ibuprofen)  Cyclobenzaprine prescribed for use as needed, has tolerated in the past  Hydrocodone-acetaminophen refilled for breakthrough pain, PDMP reviewed.  (I reviewed the mechanism of action as well as risk/benefit profile of medications. Questions answered.)    Referred to Physical Therapy    Work Letter Given    Discussed activity considerations and other supportive care including Ice/Heat, and other topical medications as needed.    -We also discussed other future treatment options:  Referral to Spine  Referred for TALYA    Follow Up: ~ 1 week, virtual visit    If you have any further questions for your physician or physician s care team you can call [phone].

## 2024-12-26 NOTE — LETTER
December 26, 2024      Edward Hinds  [address] [facility] [address]        To Whom It May Concern,     Edward was seen for leg pain.  He should be excused from work Jan 3rd and 4th.      Sincerely,        Edilia Vallecillo MD    Electronically signed

## 2024-12-26 NOTE — LETTER
December 26, 2024      Edward Hinds  [address] [facility] [address]        To Whom It May Concern,     Edward was seen for left leg pain.  Please excuse from work Jand 2nd and 3rd.      Sincerely,        Edilia Vallecillo MD    Electronically signed

## 2024-12-31 ENCOUNTER — THERAPY VISIT (OUTPATIENT)
Dept: PHYSICAL THERAPY | Facility: CLINIC | Age: 44
End: 2024-12-31
Attending: PEDIATRICS
Payer: COMMERCIAL

## 2024-12-31 DIAGNOSIS — M54.16 LUMBAR RADICULOPATHY: ICD-10-CM

## 2024-12-31 DIAGNOSIS — M79.652 PAIN OF LEFT THIGH: Primary | ICD-10-CM

## 2024-12-31 PROCEDURE — 97110 THERAPEUTIC EXERCISES: CPT | Mod: GP | Performed by: PHYSICAL THERAPIST

## 2024-12-31 PROCEDURE — 97112 NEUROMUSCULAR REEDUCATION: CPT | Mod: GP | Performed by: PHYSICAL THERAPIST

## 2024-12-31 PROCEDURE — 97161 PT EVAL LOW COMPLEX 20 MIN: CPT | Mod: GP | Performed by: PHYSICAL THERAPIST

## 2024-12-31 NOTE — PROGRESS NOTES
PHYSICAL THERAPY EVALUATION  Type of Visit: Evaluation              Subjective         Presenting condition or subjective complaint: (Patient-Rptd) I cannot sit,stand, or walk for more than a few minutes without having to lay down for relief. Referred to PT for low back pain. Onset: Was putting children's bikes in rafters and lost balance when ladder shifted. Was hanging and son was able to replce ladder. Pain since. Plans to schedule injection.   Date of onset: 12/01/24 (3 weeks before being seen in ED for L hamstring pain)    Relevant medical history:     Dates & types of surgery: (Patient-Rptd) back pain herneated disc    Prior diagnostic imaging/testing results: (Patient-Rptd) MRI; CT scan     Prior therapy history for the same diagnosis, illness or injury:        Prior Level of Function  Transfers: Independent  Ambulation: Independent  ADL: Independent  IADL: Childcare, Driving, Finances, Work    Living Environment  Social support: (Patient-Rptd) With a significant other or spouse   Type of home: (Patient-Rptd) House; 2-story   Stairs to enter the home: (Patient-Rptd) Yes       Ramp: (Patient-Rptd) No   Stairs inside the home: (Patient-Rptd) Yes (Patient-Rptd) 15 Is there a railing: (Patient-Rptd) Yes     Help at home: (Patient-Rptd) Home management tasks (cooking, cleaning); Home and Yard maintenance tasks; Assist for driving and community activities  Equipment owned: (Patient-Rptd) Crutches     Employment: (Patient-Rptd) Yes (Patient-Rptd)  10-14,000 steps per day. On break and would like to have calming before going back to school. Concerned about recovery.   Hobbies/Interests:      Patient goals for therapy:  Pain relief eg exercises and posoitioning to get out of pain and calm with inj and PT and return to PLOF    Pain assessment:   Symptoms L hamstrings into knee joint, no lower leg or foot pain L  Can get near 0/10 if laying prone or supine  Standing 5 minutes gradual increases pain  and has to lay, walking 2-3 minutes and then has to lay down, sittin and 1/2 to 2 minutes and feels hamstring and knee pain L and at 3-5 minutes needs to lay down.    Objective   LUMBAR SPINE EVALUATION  INTEGUMENTARY (edema, incisions): normal  POSTURE: forward in sitting and standing with L shift noted as well.   GAIT:   Weightbearing Status: WBAT  Assistive Device(s): None  Gait Deviations: WNL  ROM:  supine JOSE no change )almost 0/10) and prone lying same as JOSE, some symptom increase with TATIANA and EIL.  NEURAL TENSION: Supine SLRs  L: positive at approx 40 degrees with DF increases hamstring symptoms L and cervical flexion decreases symptoms. R: approx 40 degrees with increased symptoms with cervical flexion. Reversing - cervical flexion first: R: increased SLR to approx 70 degrees and decreases L SLR.   PALPATION: tightness and spasming with light palpation lower lumbar paraspinal muscles.     Assessment & Plan   CLINICAL IMPRESSIONS  Medical Diagnosis: Pain of left thigh (M79.652)    Lumbar radiculopathy (M54.16)    Treatment Diagnosis: Pain of left thigh (M79.652)    Lumbar radiculopathy (M54.16)   Impression/Assessment: Patient is a 44 year old male with walking, sitting, standing, riding in car complaints.  The following significant findings have been identified: Pain, Decreased ROM/flexibility, Decreased activity tolerance, and Impaired posture. These impairments interfere with their ability to perform self care tasks, work tasks, recreational activities, household chores, driving , household mobility, and community mobility as compared to previous level of function.     Clinical Decision Making (Complexity):  Clinical Presentation: Stable/Uncomplicated  Clinical Presentation Rationale: based on medical and personal factors listed in PT evaluation  Clinical Decision Making (Complexity): Low complexity    PLAN OF CARE  Treatment Interventions:  Modalities:  as needed for symptom control  Interventions:  Manual Therapy, Neuromuscular Re-education, Therapeutic Activity, Therapeutic Exercise, Self-Care/Home Management    Long Term Goals     PT Goal 1  Goal Identifier: Calm symptoms  Goal Description: Edward will use warm/cool packs, positioning, posture and pain education concepts to calm symptoms as noted by medium KAPIL and low BRITTNEY score showing improved function  Rationale: to maximize safety and independence with performance of ADLs and functional tasks;to maximize safety and independence within the home;to maximize safety and independence within the community;to maximize safety and independence with transportation;to maximize safety and independence with self cares  Target Date: 01/17/25  PT Goal 2  Goal Identifier: Activity  Goal Description: Edward will work into walking, sitting and standing so he can drive to work and complete his daily job tasks  includes walking 10, 000+ steps per day  Target Date: 01/24/25      Frequency of Treatment: 2 times per week  Duration of Treatment: 6 weeks    Recommended Referrals to Other Professionals:  no needs identified at this time  Education Assessment:   Learner/Method: Patient;Listening;Reading;Demonstration;Pictures/Video;No Barriers to Learning (downloaded on cell end of session)  Education Comments: prone positioning with hip shift, posture in sitting,  goals, plan of care. initiated pain education    Risks and benefits of evaluation/treatment have been explained.   Patient/Family/caregiver agrees with Plan of Care.     Evaluation Time:     PT Eval, Low Complexity Minutes (08935): 23   Present: Not applicable     Signing Clinician: Marilyn Gomes, PT

## 2025-01-02 ENCOUNTER — THERAPY VISIT (OUTPATIENT)
Dept: PHYSICAL THERAPY | Facility: CLINIC | Age: 45
End: 2025-01-02
Attending: PEDIATRICS
Payer: COMMERCIAL

## 2025-01-02 ENCOUNTER — VIRTUAL VISIT (OUTPATIENT)
Dept: ORTHOPEDICS | Facility: CLINIC | Age: 45
End: 2025-01-02
Payer: COMMERCIAL

## 2025-01-02 DIAGNOSIS — M51.369 DEGENERATION OF INTERVERTEBRAL DISC OF LUMBAR REGION, UNSPECIFIED WHETHER PAIN PRESENT: ICD-10-CM

## 2025-01-02 DIAGNOSIS — M54.16 LUMBAR RADICULOPATHY: Primary | ICD-10-CM

## 2025-01-02 DIAGNOSIS — M79.652 PAIN OF LEFT THIGH: ICD-10-CM

## 2025-01-02 DIAGNOSIS — M79.652 PAIN OF LEFT THIGH: Primary | ICD-10-CM

## 2025-01-02 DIAGNOSIS — M54.16 LUMBAR RADICULOPATHY: ICD-10-CM

## 2025-01-02 PROCEDURE — 97110 THERAPEUTIC EXERCISES: CPT | Mod: GP | Performed by: PHYSICAL THERAPIST

## 2025-01-02 RX ORDER — NAPROXEN 500 MG/1
500 TABLET ORAL 2 TIMES DAILY PRN
Qty: 30 TABLET | Refills: 1 | Status: SHIPPED | OUTPATIENT
Start: 2025-01-02

## 2025-01-02 NOTE — PROGRESS NOTES
Edward is a 44 year old who is being evaluated via a billable video visit.    How would you like to obtain your AVS? MyChart  If the video visit is dropped, the invitation should be resent by: Text to cell phone: [phone]  Will anyone else be joining your video visit? No    Video-Visit Details    Type of service:  Video Visit   Originating Location (pt. Location): Home    Distant Location (provider location):  On-site  Platform used for Video Visit: [facility]      ASSESSMENT & PLAN    Diagnoses and all orders for this visit:    Pain of left thigh  -     naproxen (NAPROSYN) 500 MG tablet; Take 1 tablet (500 mg) by mouth 2 times daily as needed for moderate pain.    Lumbar radiculopathy  -     naproxen (NAPROSYN) 500 MG tablet; Take 1 tablet (500 mg) by mouth 2 times daily as needed for moderate pain.    Degeneration of intervertebral disc of lumbar region, unspecified whether pain present  -     naproxen (NAPROSYN) 500 MG tablet; Take 1 tablet (500 mg) by mouth 2 times daily as needed for moderate pain.    Other orders  -     Sports Med Adult Follow-Up Clinic Order (Blank); Future  -     Sports Med Adult Follow-Up Clinic Order; Future      This issue is acute on chronic and Unchanged.      ICD-10-CM    1. Pain of left thigh  M79.652 naproxen (NAPROSYN) 500 MG tablet      2. Lumbar radiculopathy  M54.16 naproxen (NAPROSYN) 500 MG tablet      3. Degeneration of intervertebral disc of lumbar region, unspecified whether pain present  M51.369 naproxen (NAPROSYN) 500 MG tablet        Patient Instructions   We discussed these other possible diagnosis: Improving slightly with PT. Injection is scheduled.    Plan:  - Today's Plan of Care:  Continue current medications - refilled naproxen, continue cyclobenzaprine as needed (I reviewed the mechanism of action as well as risk/benefit profile of medications. Questions answered.)    Continue PT    Work letter updated    Discussed activity considerations and other supportive care  including Ice/Heat, OTC and other topical medications as needed.    -We also discussed other future treatment options:  Referral to Spine    Follow Up: 2 weeks if needing to review work restrictions, otherwise, 2 weeks post injection    Concerning signs and symptoms were reviewed and all questions were answered at this time.    Edilia Vallecillo MD [facility]  Sports Medicine Physician  [facility]    SUBJECTIVE- Interim History January 2, 2025    No chief complaint on file.      Edward Hinds is a 44 year old male who is seen in f/u up for    Pain of left thigh  Lumbar radiculopathy  Degeneration of intervertebral disc of lumbar region, unspecified whether pain present.    Since last office visit on 12/27/24, patient has been feeling a little bit better. He was talking with the physical therapist and noticed that he was doing a decent amount of pain at that point, but now it is feeling better since doing the physical therapy and has learned some tips to help with his pain.The physical therapist was VERY helpful.  Naproxen, flexeril has been very helpful .  He has still needed to lay flat in a car ride.  He states that he was gasper to sit in the chair on the way home. Today he is following up on symptoms and to discuss work restrictions. Has injection scheduled in the upcoming 1.5 weeks.  - Now ~ 4 weeks from initial onset    Worsened by: standing too long, sitting too long  Better with: laying down, physical therapy, Naprosyn, flexeril   Treatments tried: rest/activity avoidance, ice, heat, Tylenol, other medications: Cyclobenzaprine (Flexeril)  and Naprosyn, home exercises, physical therapy (2 visits), and previous imaging (MRI 12/26/24 and xray 12/26/24)  Associated symptoms:  weakness of left knee/left thigh  and sharp pain constant, radiating from the knee up   - Went to UC last week had prednisone which didn't help  - Presented to the ED earlier this week, had US and recommended to use knee  immobilizer     Orthopedic/Surgical history: Yes, has had herniated disc in L5 in the past, other leg  Social History/Occupation: on break, he is an      REVIEW OF SYSTEMS:  Review of Systems    OBJECTIVE:  There were no vitals taken for this visit.   GENERAL: alert and no distress  EYES: Eyes grossly normal to inspection.  No discharge or erythema, or obvious scleral/conjunctival abnormalities.  RESP: No audible wheeze, cough, or visible cyanosis.    SKIN: Visible skin clear. No significant rash, abnormal pigmentation or lesions.  NEURO: Cranial nerves grossly intact.  Mentation and speech appropriate for age.  PSYCH: Appropriate affect, tone, and pace of words    RADIOLOGY:  Final results and radiologist's interpretation, available in the [facility] health record.  Images were reviewed with the patient in the office today.  My personal interpretation of the performed imaging:     Reviewed MRI Lumbar Spine 12/26/24 - multilevel degenerative changes, most notable at L5-S1 with foraminal stenosis on the left     Results for orders placed or performed during the hospital encounter of 12/26/24   MRI Lumbar spine w/o contrast    Narrative    MRI LUMBAR SPINE WITHOUT CONTRAST   12/26/2024 2:50 PM     HISTORY: Pain of left thigh; Lumbar radiculopathy     TECHNIQUE: Multiplanar multisequence MRI of the lumbar spine without  contrast.     COMPARISON: 6/29/2023.     FINDINGS: Vertebral body heights are maintained. Mild loss of disc  height findings at L4-5 and L5-S1. No significant anterolisthesis or  retrolisthesis. Marrow signal unremarkable.    No significant STIR edema within the vertebral column.    L1-2: No canal stenosis. Mild facet disease. No foraminal narrowing.    L2-3: Broad-based disc bulge with mild canal stenosis. Facet disease.  No foraminal narrowing.    L3-4: Right based disc bulge with moderate canal stenosis. Facet  disease. No foraminal narrowing.    L4-5: Broad-based disc bulge with central  annular fissuring. Mild  canal stenosis. Facet disease. Mild bilateral foraminal narrowing.    L5-S1: Broad-based disc bulge. Mild canal stenosis. Moderate left and  mild right foraminal narrowing.    Paravertebral soft tissues are unremarkable.      Impression    IMPRESSION:  Multilevel degenerative changes in the lumbar spine. No  high-grade canal stenotic findings. Please see above discussion..     TEDDY PETIT MD         SYSTEM ID:  [id number]     Reviewed PT note  Review of the result(s) of each unique test - MRI

## 2025-01-02 NOTE — LETTER
1/2/2025      Edward Hinds  [address] [facility] [address]      Dear Colleague,    Thank you for referring your patient, Edward Hinds, to the [facility]. Please see a copy of my visit note below.    Edward is a 44 year old who is being evaluated via a billable video visit.    How would you like to obtain your AVS? MyChart  If the video visit is dropped, the invitation should be resent by: Text to cell phone: [phone]  Will anyone else be joining your video visit? No    Video-Visit Details    Type of service:  Video Visit   Originating Location (pt. Location): Home    Distant Location (provider location):  On-site  Platform used for Video Visit: [employer]      ASSESSMENT & PLAN    Diagnoses and all orders for this visit:    Pain of left thigh  -     naproxen (NAPROSYN) 500 MG tablet; Take 1 tablet (500 mg) by mouth 2 times daily as needed for moderate pain.    Lumbar radiculopathy  -     naproxen (NAPROSYN) 500 MG tablet; Take 1 tablet (500 mg) by mouth 2 times daily as needed for moderate pain.    Degeneration of intervertebral disc of lumbar region, unspecified whether pain present  -     naproxen (NAPROSYN) 500 MG tablet; Take 1 tablet (500 mg) by mouth 2 times daily as needed for moderate pain.    Other orders  -     Sports Med Adult Follow-Up Clinic Order (Blank); Future  -     Sports Med Adult Follow-Up Clinic Order; Future      This issue is acute on chronic and Unchanged.      ICD-10-CM    1. Pain of left thigh  M79.652 naproxen (NAPROSYN) 500 MG tablet      2. Lumbar radiculopathy  M54.16 naproxen (NAPROSYN) 500 MG tablet      3. Degeneration of intervertebral disc of lumbar region, unspecified whether pain present  M51.369 naproxen (NAPROSYN) 500 MG tablet        Patient Instructions   We discussed these other possible diagnosis: Improving slightly with PT. Injection is scheduled.    Plan:  - Today's Plan of Care:  Continue current medications - refilled  naproxen, continue cyclobenzaprine as needed (I reviewed the mechanism of action as well as risk/benefit profile of medications. Questions answered.)    Continue PT    Work letter updated    Discussed activity considerations and other supportive care including Ice/Heat, OTC and other topical medications as needed.    -We also discussed other future treatment options:  Referral to Spine    Follow Up: 2 weeks if needing to review work restrictions, otherwise, 2 weeks post injection    Concerning signs and symptoms were reviewed and all questions were answered at this time.    Edilia Vallecillo MD [facility]  Sports Medicine Physician  [facility]    SUBJECTIVE- Interim History January 2, 2025    No chief complaint on file.      Edward Hinds is a 44 year old male who is seen in f/u up for    Pain of left thigh  Lumbar radiculopathy  Degeneration of intervertebral disc of lumbar region, unspecified whether pain present.    Since last office visit on 12/27/24, patient has been feeling a little bit better. He was talking with the physical therapist and noticed that he was doing a decent amount of pain at that point, but now it is feeling better since doing the physical therapy and has learned some tips to help with his pain.The physical therapist was VERY helpful.  Naproxen, flexeril has been very helpful .  He has still needed to lay flat in a car ride.  He states that he was gasper to sit in the chair on the way home. Today he is following up on symptoms and to discuss work restrictions. Has injection scheduled in the upcoming 1.5 weeks.  - Now ~ 4 weeks from initial onset    Worsened by: standing too long, sitting too long  Better with: laying down, physical therapy, Naprosyn, flexeril   Treatments tried: rest/activity avoidance, ice, heat, Tylenol, other medications: Cyclobenzaprine (Flexeril)  and Naprosyn, home exercises, physical therapy (2 visits), and previous imaging (MRI 12/26/24 and xray  12/26/24)  Associated symptoms:  weakness of left knee/left thigh  and sharp pain constant, radiating from the knee up   - Went to [facility] last week had prednisone which didn't help  - Presented to the ED earlier this week, had US and recommended to use knee immobilizer     Orthopedic/Surgical history: Yes, has had herniated disc in L5 in the past, other leg  Social History/Occupation: on break, he is an      REVIEW OF SYSTEMS:  Review of Systems    OBJECTIVE:  There were no vitals taken for this visit.   GENERAL: alert and no distress  EYES: Eyes grossly normal to inspection.  No discharge or erythema, or obvious scleral/conjunctival abnormalities.  RESP: No audible wheeze, cough, or visible cyanosis.    SKIN: Visible skin clear. No significant rash, abnormal pigmentation or lesions.  NEURO: Cranial nerves grossly intact.  Mentation and speech appropriate for age.  PSYCH: Appropriate affect, tone, and pace of words    RADIOLOGY:  Final results and radiologist's interpretation, available in the [facility] health record.  Images were reviewed with the patient in the office today.  My personal interpretation of the performed imaging:     Reviewed MRI Lumbar Spine 12/26/24 - multilevel degenerative changes, most notable at L5-S1 with foraminal stenosis on the left     Results for orders placed or performed during the hospital encounter of 12/26/24   MRI Lumbar spine w/o contrast    Narrative    MRI LUMBAR SPINE WITHOUT CONTRAST   12/26/2024 2:50 PM     HISTORY: Pain of left thigh; Lumbar radiculopathy     TECHNIQUE: Multiplanar multisequence MRI of the lumbar spine without  contrast.     COMPARISON: 6/29/2023.     FINDINGS: Vertebral body heights are maintained. Mild loss of disc  height findings at L4-5 and L5-S1. No significant anterolisthesis or  retrolisthesis. Marrow signal unremarkable.    No significant STIR edema within the vertebral column.    L1-2: No canal stenosis. Mild facet disease. No foraminal  narrowing.    L2-3: Broad-based disc bulge with mild canal stenosis. Facet disease.  No foraminal narrowing.    L3-4: Right based disc bulge with moderate canal stenosis. Facet  disease. No foraminal narrowing.    L4-5: Broad-based disc bulge with central annular fissuring. Mild  canal stenosis. Facet disease. Mild bilateral foraminal narrowing.    L5-S1: Broad-based disc bulge. Mild canal stenosis. Moderate left and  mild right foraminal narrowing.    Paravertebral soft tissues are unremarkable.      Impression    IMPRESSION:  Multilevel degenerative changes in the lumbar spine. No  high-grade canal stenotic findings. Please see above discussion..     TEDDY PETIT MD         SYSTEM ID:  [id number]     Reviewed PT note  Review of the result(s) of each unique test - MRI             Again, thank you for allowing me to participate in the care of your patient.        Sincerely,        Edilia Vallecillo MD    Electronically signed

## 2025-01-02 NOTE — PATIENT INSTRUCTIONS
We discussed these other possible diagnosis: Improving slightly with PT. Injection is scheduled.    Plan:  - Today's Plan of Care:  Continue current medications - refilled naproxen, continue cyclobenzaprine as needed (I reviewed the mechanism of action as well as risk/benefit profile of medications. Questions answered.)    Continue PT    Work letter updated    Discussed activity considerations and other supportive care including Ice/Heat, OTC and other topical medications as needed.    -We also discussed other future treatment options:  Referral to Spine    Follow Up: 2 weeks if needing to review work restrictions, otherwise, 2 weeks post injection    If you have any further questions for your physician or physician s care team you can call [phone].

## 2025-01-02 NOTE — LETTER
January 2, 2025      Edward Hinds  [id number] [place] [facility] [address]        To Whom It May Concern,     Edward was seen for low back pain.  He should be off work until 12/20/2025.    Upon return, the following restrictions are in place:  - Consider shorter hours, allow frequent position changes (sitting and standing), limit extended walking and standing, and allowing a mat in office to lay on floor as needed for comfort.      Sincerely,        Edilia Vallecillo MD    Electronically signed

## 2025-01-09 NOTE — DISCHARGE INSTRUCTIONS
MRI results showed that you have a large right-sided disc extrusion at L5-S1 which is impinging the right L5 and S1 nerve roots.  You have some other mild to moderate degenerative changes throughout the rest your spine as well.    The large disc extrusion is causing the numbness in your leg.  The Medrol dose pack, a steroid, should help to reduce some of the inflammation in your back.  Use ibuprofen 600 mg every 6-8 hours as needed as well for pain control.    The neurosurgical team will call you in the morning to schedule a follow-up visit to discuss your options for treatment.    If you have any worsening concerns in the meantime however please do not hesitate to return to the emergency department.    Thank you for choosing [facility]'s Emergency Department. It was a pleasure taking care of you today. If you have any questions, please call [phone].    Jocelin High PA-C    
Universal Safety Interventions

## 2025-01-13 ENCOUNTER — TELEPHONE (OUTPATIENT)
Dept: ORTHOPEDICS | Facility: CLINIC | Age: 45
End: 2025-01-13
Payer: COMMERCIAL

## 2025-01-13 NOTE — PROGRESS NOTES
[facility] - Procedure Note    Date of Visit: 1/15/2025    Procedure performed: Lumbar L4-5 interlaminar epidural steroid injection  Diagnosis: Lumbar spondylosis; Lumbar radiculitis/radiculopathy  : Mala Mcmillan MD  Anesthesia: none    Indications: Edward Hinds is a 44 year old male who is seen at the request of Dr. Vallecillo for a lumbar epidural steroid injection. The patient describes L>R radiating leg pain. The patient has been exhibiting symptoms consistent with lumbar intraspinal inflammation and radiculopathy. Symptoms have been persistent, disabling, and intermittently severe. The patient reports minimal improvement with conservative treatment, including one previous LESI 4 years ago, PT and medications.    MRI LUMBAR SPINE was done on 12/26/2024 which showed:   FINDINGS: Vertebral body heights are maintained. Mild loss of disc  height findings at L4-5 and L5-S1. No significant anterolisthesis or  retrolisthesis. Marrow signal unremarkable.     No significant STIR edema within the vertebral column.     L1-2: No canal stenosis. Mild facet disease. No foraminal narrowing.     L2-3: Broad-based disc bulge with mild canal stenosis. Facet disease.  No foraminal narrowing.     L3-4: Right based disc bulge with moderate canal stenosis. Facet  disease. No foraminal narrowing.     L4-5: Broad-based disc bulge with central annular fissuring. Mild  canal stenosis. Facet disease. Mild bilateral foraminal narrowing.     L5-S1: Broad-based disc bulge. Mild canal stenosis. Moderate left and  mild right foraminal narrowing.     Paravertebral soft tissues are unremarkable.     IMPRESSION:  Multilevel degenerative changes in the lumbar spine. No  high-grade canal stenotic findings. Please see above discussion..     Allergies:      Allergies   Allergen Reactions    Penicillin [Penicillins]      Mother says penicillin          Vitals:  BP (!) 133/91   Pulse 88   SpO2 96%     Review of  Systems: The patient denies recent fever, chills, illness, use of antibiotics or anticoagulants. All other 10-point review of systems negative.     Procedure: The procedure and risks were explained, and informed written consent was obtained from the patient. Risks include but are not limited to: infection, bleeding, increased pain, and damage to soft tissue, nerve, muscle, and vasculature structures. After getting informed consent, patient was brought into the procedure suite and was placed in a prone position on the procedure table. A Pause for the Cause was performed. Patient was prepped and draped in sterile fashion.     The L4-5 interspace was identified with use of fluoroscopy in AP view. A 25-gauge, 1.5 inch needle was used to anesthetize the skin and subcutaneous tissue entry site with a total of 2 ml of 1% lidocaine. Under fluoroscopic visualization, a 20-gauge, 3.5 inch Tuohy epidural needle was slowly advanced towards the epidural space a few millimeters left of midline. The latter part of the needle advancement was guided with fluoroscopy in the lateral view. The epidural space was identified using loss of resistance technique. After negative aspiration for heme and cerebrospinal fluid, a total of 1 mL of non-ionic contrast was injected to confirm needle placement with 0 mL of contrast wasted. Epidurogram confirmed spread within the posterior epidural space. 2 ml of 40mg/ml of triamcinolone, 2 ml of 1% lidocaine, and 1 ml of preservative free saline was injected. The needle was removed.  Images were saved to PACS.    The patient tolerated the procedure well, and there was no evidence of procedural complications. No new sensory or motor deficits were noted following the procedure. The patient was stable and able to ambulate on discharge home. Post-procedure instructions were provided.     Pre-procedure pain score: 2/10 in the back, 2/10 in the leg  Post-procedure pain score: 1/10 in the back, 1/10 in the  leg    Assessment/Plan: Edward Hinds is a 44 year old male s/p lumbar interlaminar epidural steroid injection today for lumbar spondylosis and radiculitis/radiculopathy.     1. Following today's procedure, the patient was advised to contact the Pain Management Center for any of the following:   Fever, chills, or night sweats   New onset of pain, numbness, or weakness   Any questions/concerns regarding the procedure  If unable to contact the Pain Center, the patient was instructed to go to a local Emergency Room for any complications.   2. Follow-up with the referring provider in 2 weeks for post-procedure evaluation.    JENNIFER RIZO MD   Pain Management

## 2025-01-13 NOTE — TELEPHONE ENCOUNTER
Certification of HCPR for FMLA paperwork ready for review and signature.     Nimo Stack ATC, CSCS  Dr. Vallecillo's Clinical Coordinator  [facility] Medicine Team

## 2025-01-15 ENCOUNTER — RADIOLOGY INJECTION OFFICE VISIT (OUTPATIENT)
Dept: PALLIATIVE MEDICINE | Facility: CLINIC | Age: 45
End: 2025-01-15
Attending: PEDIATRICS
Payer: COMMERCIAL

## 2025-01-15 VITALS — HEART RATE: 88 BPM | OXYGEN SATURATION: 96 % | SYSTOLIC BLOOD PRESSURE: 133 MMHG | DIASTOLIC BLOOD PRESSURE: 91 MMHG

## 2025-01-15 DIAGNOSIS — M79.652 PAIN OF LEFT THIGH: ICD-10-CM

## 2025-01-15 DIAGNOSIS — M54.16 LUMBAR RADICULOPATHY: Primary | ICD-10-CM

## 2025-01-15 DIAGNOSIS — M51.369 DEGENERATION OF INTERVERTEBRAL DISC OF LUMBAR REGION, UNSPECIFIED WHETHER PAIN PRESENT: ICD-10-CM

## 2025-01-15 PROCEDURE — 62323 NJX INTERLAMINAR LMBR/SAC: CPT | Performed by: ANESTHESIOLOGY

## 2025-01-15 RX ORDER — TRIAMCINOLONE ACETONIDE 40 MG/ML
40 INJECTION, SUSPENSION INTRA-ARTICULAR; INTRAMUSCULAR ONCE
Status: COMPLETED | OUTPATIENT
Start: 2025-01-15 | End: 2025-01-15

## 2025-01-15 RX ADMIN — TRIAMCINOLONE ACETONIDE 40 MG: 40 INJECTION, SUSPENSION INTRA-ARTICULAR; INTRAMUSCULAR at 15:36

## 2025-01-15 ASSESSMENT — PAIN SCALES - GENERAL: PAINLEVEL_OUTOF10: NO PAIN (1)

## 2025-01-15 NOTE — NURSING NOTE
Discharge Information    IV Discontiued Time:  NA    Amount of Fluid Infused:  NA    Discharge Criteria = When patient returns to baseline or as per MD order    Consciousness:  Pt is fully awake    Circulation:  BP +/- 20% of pre-procedure level    Respiration:  Patient is able to breathe deeply    O2 Sat:  Patient is able to maintain O2 Sat >92% on room air    Activity:  Moves 4 extremities on command    Ambulation:  Patient is able to stand and walk or stand and pivot into wheelchair    Dressing:  Clean/dry or No Dressing    Notes:   Discharge instructions and AVS given to patient    Patient meets criteria for discharge?  YES    Admitted to PCU?  No    Responsible adult present to accompany patient home?  Yes    Signature/Title:    Noelle Maradiaga RN  RN Care Coordinator  [facility]

## 2025-01-15 NOTE — TELEPHONE ENCOUNTER
FMLA forms are faxed to;  [phone]     Nimo Stack ATC, CSCS  Dr. Vallecillo's Clinical Coordinator  [facility] Sports Medicine Team

## 2025-01-15 NOTE — NURSING NOTE
Pre-procedure Intake  If YES to any questions or NO to having a   Please complete laminated checklist and leave on the computer keyboard for Provider, verbally inform provider if able.    For SCS Trial, RFA's or any sedation procedure:  Have you been fasting? NA  If yes, for how long?     Are you taking any any blood thinners such as Coumadin, Warfarin, Jantoven, Pradaxa Xarelto, Eliquis, Edoxaban, Enoxaparin, Lovenox, Heparin, Arixtra, Fondaparinux, or Fragmin? OR Antiplatelet medication such as Plavix, Brilinta, or Effient?   No   If yes, when did you take your last dose?     Do you take aspirin?  No  If cervical procedure, have you held aspirin for 6 days?   NA    Is the Pt taking any GLP-1 Antagonist (hold needed for sedation patients only)  (semaglutide (Ozempic, Wegovy), dulaglutide (Trulicity), exenatide ER (Bydureon), tirzepatide (Mounjaro), Liraglutide (Saxenda, Victoza), semaglutide (Rybelsus)     NA  If yes, when did you take your last dose?     Do you have any allergies to contrast dye, iodine, steroid and/or numbing medications?  NO    Are you currently taking antibiotics or have an active infection?  NO    Have you had a fever/elevated temperature within the past week? NO    Are you currently taking oral steroids? NO    Do you have a ? Yes    Are you pregnant or breastfeeding?  Not Applicable    Have you received any vaccinations in the last week? NO    Notify provider and RNs if systolic BP >170, diastolic BP >100, P >100 or O2 sats < 90%     Dayana Felix MA  [facility]

## 2025-01-15 NOTE — PATIENT INSTRUCTIONS
[facility] Procedure Discharge Instructions    Today you saw:   Dr. Mala Mcmillan     Your procedure:  Interlaminar Epidural steroid injection       Medications used:  Lidocaine (anesthetic)  Kenalog (steroid)  Omnipaque (contrast)        If you were holding your blood thinning medication, please restart taking it: N/A        Be cautious when walking as numbness and/or weakness in the legs may occur up to 6-8 hours after the procedure due to effect of the local anesthetic  Do not drive for 6 hours. The effect of the local anesthetic could slow your reflexes.   Avoid strenuous activity for the first 24 hours. You may resume your regular activities after that.   You may shower, however avoid swimming, tub baths or hot tubs for 24 hours following your procedure  You may have a mild to moderate increase in pain for several days following the injection.    You may use ice packs for 10-15 minutes, 3 to 4 times a day at the injection site for comfort  Do not use heat to painful areas for 6 to 8 hours. This will give the local anesthetic time to wear off and prevent you from accidentally burning your skin.  Unless you have been directed to avoid the use of anti-inflammatory medications (NSAIDS-ibuprofen, Aleve, Motrin), you may use these medications or Tylenol for pain control if needed.   With diabetes, check your blood sugar more frequently than usual as your blood sugar may be higher than normal for 10-14 days following a steroid injection. Contact your doctor who manages your diabetes if your blood sugar is higher than usual  Possible side effects of steroids that you may experience include flushing, elevated blood pressure, increased appetite, mild headaches and restlessness.  All of these symptoms will get better with time.  It may take up to 14 days for the steroid medication to start working although you may feel the effect as early as a few days after the procedure.   Follow up with your referring  provider in 2-3 weeks    If you experience any of the following, call the pain center line during work hours at [phone] or on-call physician after hours at [phone]:  -Fever over 100 degree F  -Swelling, bleeding, redness, drainage, warmth at the injection site  -Progressive weakness or numbness in your legs or arms  -Loss of bowel or bladder function  -Unusual headache that is not relieved by Tylenol or your regular headache medication  -Unusual new onset of pain that is not improving

## 2025-01-16 ENCOUNTER — THERAPY VISIT (OUTPATIENT)
Dept: PHYSICAL THERAPY | Facility: CLINIC | Age: 45
End: 2025-01-16
Attending: PEDIATRICS
Payer: COMMERCIAL

## 2025-01-16 DIAGNOSIS — M54.16 LUMBAR RADICULOPATHY: Primary | ICD-10-CM

## 2025-01-16 DIAGNOSIS — M79.652 PAIN OF LEFT THIGH: ICD-10-CM

## 2025-01-16 PROCEDURE — 97110 THERAPEUTIC EXERCISES: CPT | Mod: GP | Performed by: PHYSICAL THERAPIST

## 2025-01-16 PROCEDURE — 97112 NEUROMUSCULAR REEDUCATION: CPT | Mod: GP | Performed by: PHYSICAL THERAPIST

## 2025-02-03 ENCOUNTER — OFFICE VISIT (OUTPATIENT)
Dept: ORTHOPEDICS | Facility: CLINIC | Age: 45
End: 2025-02-03
Attending: PEDIATRICS
Payer: COMMERCIAL

## 2025-02-03 DIAGNOSIS — M54.42 CHRONIC BILATERAL LOW BACK PAIN WITH LEFT-SIDED SCIATICA: ICD-10-CM

## 2025-02-03 DIAGNOSIS — M51.369 DEGENERATION OF INTERVERTEBRAL DISC OF LUMBAR REGION, UNSPECIFIED WHETHER PAIN PRESENT: ICD-10-CM

## 2025-02-03 DIAGNOSIS — M54.16 LUMBAR RADICULOPATHY: ICD-10-CM

## 2025-02-03 DIAGNOSIS — M79.652 PAIN OF LEFT THIGH: Primary | ICD-10-CM

## 2025-02-03 DIAGNOSIS — G89.29 CHRONIC BILATERAL LOW BACK PAIN WITH LEFT-SIDED SCIATICA: ICD-10-CM

## 2025-02-03 PROCEDURE — 99213 OFFICE O/P EST LOW 20 MIN: CPT | Performed by: PEDIATRICS

## 2025-02-03 NOTE — PATIENT INSTRUCTIONS
We discussed these other possible diagnosis: Overall improving with physical therapy.    Plan:  - Today's Plan of Care:  Continue physical therapy    Referral to Spine given persistent symptoms    Discussed activity considerations and other supportive care including Ice/Heat, OTC and other topical medications as needed.    -We also discussed other future treatment options:  Could consider gabapentin  Could consider repeat injection (~ 6 - 8 weeks from first)  Let us know if you are needing an additional Work Note    Follow Up: 6 - 8 weeks if needed pending Spine evaluation  - establish with primary care    If you have any further questions for your physician or physician s care team you can call [phone].

## 2025-02-03 NOTE — PROGRESS NOTES
ASSESSMENT & PLAN    Edward was seen today for follow up and back pain.    Diagnoses and all orders for this visit:    Pain of left thigh  -     Spine  Referral; Future    Lumbar radiculopathy  -     Spine  Referral; Future    Degeneration of intervertebral disc of lumbar region, unspecified whether pain present  -     Spine  Referral; Future    Chronic bilateral low back pain with left-sided sciatica  -     Spine  Referral; Future    Other orders  -     Sports Med Adult Follow-Up Clinic Order (Blank)      This issue is acute on chronic and Improving.      ICD-10-CM    1. Pain of left thigh  M79.652 Spine  Referral      2. Lumbar radiculopathy  M54.16 Spine  Referral      3. Degeneration of intervertebral disc of lumbar region, unspecified whether pain present  M51.369 Spine  Referral      4. Chronic bilateral low back pain with left-sided sciatica  M54.42 Spine  Referral    G89.29         Patient Instructions   We discussed these other possible diagnosis: Overall improving with physical therapy.    Plan:  - Today's Plan of Care:  Continue physical therapy    Referral to Spine given persistent symptoms    Discussed activity considerations and other supportive care including Ice/Heat, OTC and other topical medications as needed.    -We also discussed other future treatment options:  Could consider gabapentin  Could consider repeat injection (~ 6 - 8 weeks from first)  Let us know if you are needing an additional Work Note    Follow Up: 6 - 8 weeks if needed pending Spine evaluation  - establish with primary care    Concerning signs and symptoms were reviewed and all questions were answered at this time.    Edilia Vallecillo MD [facility]  Sports Medicine Physician  [facility]    SUBJECTIVE- Interim History February 3, 2025    Chief Complaint   Patient presents with    Spine - Follow Up, Back Pain       Edward Hinds is a 44 year old male  who is seen in f/u up for    Pain of left thigh  Lumbar radiculopathy  Degeneration of intervertebral disc of lumbar region, unspecified whether pain present  Chronic bilateral low back pain with left-sided sciatica. Since last visit on 1/2/2025 patient is definitely feeling better. Walking more, up and moving. He says every half hour he does his physical therapy home exercises and that has been helpful.  He did obtain the injection on 1/15/25 and that has made a big difference. Hasn't been taking any medications recently.  - Now ~ 8.5 weeks from initial onset    Worsened by: standing too long, sitting too long  Better with: laying down, physical therapy, Naproxen, flexeril   Treatments tried: rest/activity avoidance, ice, heat, Tylenol, other medications: Cyclobenzaprine (Flexeril)  and Naproxen, home exercises, physical therapy (regular visits), and previous imaging (MRI 12/26/24 and xray 12/26/24)  Associated symptoms:  weakness of left knee/left thigh  and sharp pain constant, radiating from the knee up      Orthopedic/Surgical history: Yes, has had herniated disc in L5 in the past, other leg  Social History/Occupation:      REVIEW OF SYSTEMS:  Review of Systems    OBJECTIVE:  There were no vitals taken for this visit.   General: healthy, alert and in no distress  Skin: no suspicious lesions or rash.  CV: distal perfusion intact   Resp: normal respiratory effort without conversational dyspnea   Psych: normal mood and affect  Gait: NORMAL  Neuro: Normal light sensory exam of lower extremity    Low back exam:    Inspection:     no visible deformity in the low back       normal skin       normal vascular       normal lymphatic       no asymmetry    Posture:      normal    Foot Inspection:     no deformity noted    Tender:     midline       paraspinal muscles    Non Tender:     remainder of lumbar spine    ROM:      limited flexion due to pain       limited extension due to pain    Strength:      hip flexion 5/5 bilateral       knee extension 5/5 bilateral       ankle dorsiflexion 5/5 bilateral       ankle plantarflexion 5/5 bilateral       dorsiflexion of the great toe 5/5 bilateral    Sensation:    grossly intact throughout lower extremities    Special tests:      straight leg raise left positive       straight leg raise right negative       neg (-) ANGIE  bilateral       neg (-) FADIR  bilateral        RADIOLOGY:  Final results and radiologist's interpretation, available in the [facility] health record.  Images were reviewed with the patient in the office today.  My personal interpretation of the performed imaging:     Reviewed MRI Lumbar Spine 12/26/24 - multilevel degenerative changes, most notable at L5-S1 with foraminal stenosis on the left     Reviewed Pain mote  Review of the result(s) of each unique test - MRI

## 2025-02-03 NOTE — LETTER
2/3/2025      Edward Hinds  [address] [facility] [address]      Dear Colleague,    Thank you for referring your patient, Edward Hinds, to the [facility]. Please see a copy of my visit note below.    ASSESSMENT & PLAN    Edward was seen today for follow up and back pain.    Diagnoses and all orders for this visit:    Pain of left thigh  -     Spine  Referral; Future    Lumbar radiculopathy  -     Spine  Referral; Future    Degeneration of intervertebral disc of lumbar region, unspecified whether pain present  -     Spine  Referral; Future    Chronic bilateral low back pain with left-sided sciatica  -     Spine  Referral; Future    Other orders  -     Sports Med Adult Follow-Up Clinic Order (Blank)      This issue is acute on chronic and Improving.      ICD-10-CM    1. Pain of left thigh  M79.652 Spine  Referral      2. Lumbar radiculopathy  M54.16 Spine  Referral      3. Degeneration of intervertebral disc of lumbar region, unspecified whether pain present  M51.369 Spine  Referral      4. Chronic bilateral low back pain with left-sided sciatica  M54.42 Spine  Referral    G89.29         Patient Instructions   We discussed these other possible diagnosis: Overall improving with physical therapy.    Plan:  - Today's Plan of Care:  Continue physical therapy    Referral to Spine given persistent symptoms    Discussed activity considerations and other supportive care including Ice/Heat, OTC and other topical medications as needed.    -We also discussed other future treatment options:  Could consider gabapentin  Could consider repeat injection (~ 6 - 8 weeks from first)  Let us know if you are needing an additional Work Note    Follow Up: 6 - 8 weeks if needed pending Spine evaluation  - establish with primary care    Concerning signs and symptoms were reviewed and all questions were answered at this  time.    Edilia Vallecillo MD [facility]  Sports Medicine Physician  [facility]    SUBJECTIVE- Interim History February 3, 2025    Chief Complaint   Patient presents with     Spine - Follow Up, Back Pain       Edward Hinds is a 44 year old male who is seen in f/u up for    Pain of left thigh  Lumbar radiculopathy  Degeneration of intervertebral disc of lumbar region, unspecified whether pain present  Chronic bilateral low back pain with left-sided sciatica. Since last visit on 1/2/2025 patient is definitely feeling better. Walking more, up and moving. He says every half hour he does his physical therapy home exercises and that has been helpful.  He did obtain the injection on 1/15/25 and that has made a big difference. Hasn't been taking any medications recently.  - Now ~ 8.5 weeks from initial onset    Worsened by: standing too long, sitting too long  Better with: laying down, physical therapy, Naproxen, flexeril   Treatments tried: rest/activity avoidance, ice, heat, Tylenol, other medications: Cyclobenzaprine (Flexeril)  and Naproxen, home exercises, physical therapy (regular visits), and previous imaging (MRI 12/26/24 and xray 12/26/24)  Associated symptoms:  weakness of left knee/left thigh  and sharp pain constant, radiating from the knee up      Orthopedic/Surgical history: Yes, has had herniated disc in L5 in the past, other leg  Social History/Occupation:      REVIEW OF SYSTEMS:  Review of Systems    OBJECTIVE:  There were no vitals taken for this visit.   General: healthy, alert and in no distress  Skin: no suspicious lesions or rash.  CV: distal perfusion intact   Resp: normal respiratory effort without conversational dyspnea   Psych: normal mood and affect  Gait: NORMAL  Neuro: Normal light sensory exam of lower extremity    Low back exam:    Inspection:     no visible deformity in the low back       normal skin       normal vascular       normal lymphatic       no  asymmetry    Posture:      normal    Foot Inspection:     no deformity noted    Tender:     midline       paraspinal muscles    Non Tender:     remainder of lumbar spine    ROM:      limited flexion due to pain       limited extension due to pain    Strength:     hip flexion 5/5 bilateral       knee extension 5/5 bilateral       ankle dorsiflexion 5/5 bilateral       ankle plantarflexion 5/5 bilateral       dorsiflexion of the great toe 5/5 bilateral    Sensation:    grossly intact throughout lower extremities    Special tests:      straight leg raise left positive       straight leg raise right negative       neg (-) ANGIE  bilateral       neg (-) FADIR  bilateral        RADIOLOGY:  Final results and radiologist's interpretation, available in the [facility] health record.  Images were reviewed with the patient in the office today.  My personal interpretation of the performed imaging:     Reviewed MRI Lumbar Spine 12/26/24 - multilevel degenerative changes, most notable at L5-S1 with foraminal stenosis on the left     Reviewed Pain mote  Review of the result(s) of each unique test - MRI             Again, thank you for allowing me to participate in the care of your patient.        Sincerely,        Edilia Vallecillo MD    Electronically signed

## 2025-02-04 ENCOUNTER — PATIENT OUTREACH (OUTPATIENT)
Dept: CARE COORDINATION | Facility: CLINIC | Age: 45
End: 2025-02-04
Payer: COMMERCIAL

## 2025-02-06 ENCOUNTER — PATIENT OUTREACH (OUTPATIENT)
Dept: CARE COORDINATION | Facility: CLINIC | Age: 45
End: 2025-02-06
Payer: COMMERCIAL

## 2025-03-05 PROBLEM — M54.16 LUMBAR RADICULOPATHY: Status: RESOLVED | Noted: 2024-12-31 | Resolved: 2025-03-05

## 2025-03-05 PROBLEM — M79.652 PAIN OF LEFT THIGH: Status: RESOLVED | Noted: 2024-12-31 | Resolved: 2025-03-05

## 2025-04-07 ENCOUNTER — OFFICE VISIT (OUTPATIENT)
Dept: PHYSICAL MEDICINE AND REHAB | Facility: CLINIC | Age: 45
End: 2025-04-07
Attending: PEDIATRICS
Payer: COMMERCIAL

## 2025-04-07 ENCOUNTER — TELEPHONE (OUTPATIENT)
Dept: PHYSICAL MEDICINE AND REHAB | Facility: CLINIC | Age: 45
End: 2025-04-07

## 2025-04-07 VITALS
WEIGHT: 189 LBS | BODY MASS INDEX: 25.6 KG/M2 | HEART RATE: 59 BPM | DIASTOLIC BLOOD PRESSURE: 87 MMHG | OXYGEN SATURATION: 98 % | SYSTOLIC BLOOD PRESSURE: 154 MMHG | HEIGHT: 72 IN

## 2025-04-07 DIAGNOSIS — M51.369 DEGENERATION OF INTERVERTEBRAL DISC OF LUMBAR REGION, UNSPECIFIED WHETHER PAIN PRESENT: ICD-10-CM

## 2025-04-07 DIAGNOSIS — M54.16 LUMBAR RADICULOPATHY: ICD-10-CM

## 2025-04-07 DIAGNOSIS — M54.42 CHRONIC BILATERAL LOW BACK PAIN WITH LEFT-SIDED SCIATICA: ICD-10-CM

## 2025-04-07 DIAGNOSIS — G89.29 CHRONIC BILATERAL LOW BACK PAIN WITH LEFT-SIDED SCIATICA: ICD-10-CM

## 2025-04-07 DIAGNOSIS — M79.652 PAIN OF LEFT THIGH: ICD-10-CM

## 2025-04-07 PROCEDURE — 99204 OFFICE O/P NEW MOD 45 MIN: CPT | Performed by: STUDENT IN AN ORGANIZED HEALTH CARE EDUCATION/TRAINING PROGRAM

## 2025-04-07 PROCEDURE — 1125F AMNT PAIN NOTED PAIN PRSNT: CPT | Performed by: STUDENT IN AN ORGANIZED HEALTH CARE EDUCATION/TRAINING PROGRAM

## 2025-04-07 PROCEDURE — 3079F DIAST BP 80-89 MM HG: CPT | Performed by: STUDENT IN AN ORGANIZED HEALTH CARE EDUCATION/TRAINING PROGRAM

## 2025-04-07 PROCEDURE — 3077F SYST BP >= 140 MM HG: CPT | Performed by: STUDENT IN AN ORGANIZED HEALTH CARE EDUCATION/TRAINING PROGRAM

## 2025-04-07 RX ORDER — GABAPENTIN 300 MG/1
300 CAPSULE ORAL AT BEDTIME
Qty: 90 CAPSULE | Refills: 0 | Status: SHIPPED | OUTPATIENT
Start: 2025-04-07 | End: 2025-07-06

## 2025-04-07 ASSESSMENT — PAIN SCALES - GENERAL: PAINLEVEL_OUTOF10: SEVERE PAIN (8)

## 2025-04-07 NOTE — TELEPHONE ENCOUNTER
Procedure ordered? YES    What insurance are we billing for this procedure?  [employer]  IF SCHEDULING AT [facility]IN OR SPINE PLEASE SCHEDULE AT LEAST 7-10 BUSINESS DAYS OUT SO A PA CAN BE OBTAINED    Is a  PAIN  order available to link to injection appointment or does one need to be transcribed?  YES:     PAIN Transforaminal TALYA Inj Lumbosacral One Level Left      If needed, route to CN to assist in doing so.    Is  needed?: No   If YES, please initiate in-person  request.    Will patient have a ?  Yes    All fluoro procedures require a .  These US procedures also require a : piriformis, pudendal, scalene, & carpal tunnel.    Is patient taking any blood thinners (i.e. Plavix, coumadin, jantoven, warfarin, heparin, Xarelto, Pradaxa, Eliquis, Brilinta, or Effient, etc)? No   If YES, schedule out 2 weeks, and route to RN pool to determine if medication hold is needed.    Is patient taking aspirin? No   For CERVICAL or INTERLAMINAR procedures, route message to Care Navigation so they can seek approval from managing provider to hold aspirin for 6 days prior to the procedure.    Does patient have an allergy to contrast dye or iodine?  No  If YES, OK to schedule. Route to RN pool AND add allergy information to appointment notes    Is patient diabetic? No If YES, blood glucose level will be checked on day of procedure and will need to be 300mg/dL or below (for steroid injections).    Does patient have an active infection or treated for one within the past week? No   If YES, do NOT schedule and route to Care Navigation.     Is patient currently taking any antibiotics or have an active infection?  No  For patients on chronic, preventative, or prophylactic antibiotics, procedures may be scheduled.   For patients on antibiotics for active or recent infection: antibiotic course must have been completed and symptom-free of infection to safely proceed with injection.  Send to Care  Navigation if unsure.    Is patient actively being treated for cancer or immunocompromised? No  If YES, do NOT schedule and route to RN pool.     Any chance of pregnancy? Not Applicable   If YES, do NOT schedule and route to RN pool.    Have you had any vaccines in the last 2 weeks? NO  If YES, please route to Care Navigation to discuss before scheduling.     Reminders:  -  If you are started on any steroids or antibiotics between now and your appointment, you must contact us because it may affect our ability to perform your procedure.   reviewed    -  Informed patient that it is OK to take normal medications before the procedure and must hold blood thinners as instructed.  reviewed    -  Patients scheduled for MBBs should not take pain meds prior to injection and pain rating needs to be 5/10 or greater the day of the procedure.    -  Informed cervical injection patients that an IV will be placed as a precautionary measure.  reviewed    -  Patients should eat a light meal prior to their procedure appointment.  reviewed    -  All radiofrequency ablations are in a 40 minute time slot and not to be scheduled until in-basket message has been sent by the procedure team that the PA has been  received.  reviewed     -  Spinal cord stimulator trial scheduling is coordinated by the procedure team.    -  Care Navigation (#[phone]) is available to assist patients with additional questions.  reviewed    -  Cervical TFESIs with Dr. Lewis only.

## 2025-04-07 NOTE — LETTER
4/7/2025      Edward Hinds  [id number] [place] [address]      Dear Colleague,    Thank you for referring your patient, Edward Hinds, to the [facility]. Please see a copy of my visit note below.    ASSESSMENT:  Edward Hinds is a 44 year old male presents for consultation at the request of Edilia Vallecillo who presents today for new patient evaluation of :         Diagnoses and all orders for this visit:  Pain of left thigh  -     Spine  Referral  Lumbar radiculopathy  -     Spine  Referral  -     gabapentin (NEURONTIN) 300 MG capsule; Take 1 capsule (300 mg) by mouth at bedtime.  -     PAIN Transforaminal TALYA Inj Lumbosacral One Level Left; Future  -     Adult Neurosurgery  Referral; Future  Degeneration of intervertebral disc of lumbar region, unspecified whether pain present  -     Spine  Referral  -     gabapentin (NEURONTIN) 300 MG capsule; Take 1 capsule (300 mg) by mouth at bedtime.  -     PAIN Transforaminal TALYA Inj Lumbosacral One Level Left; Future  -     Adult Neurosurgery  Referral; Future  Chronic bilateral low back pain with left-sided sciatica  -     Spine  Referral  -     gabapentin (NEURONTIN) 300 MG capsule; Take 1 capsule (300 mg) by mouth at bedtime.  -     PAIN Transforaminal TALYA Inj Lumbosacral One Level Left; Future  -     Adult Neurosurgery  Referral; Future       Patient is neurologically intact on exam. No myelopathic or red flag symptoms.    Patient is a 44-year-old male presenting for evaluation of left-sided low back pain with left-sided lumbosacral radiculopathy approximating an L5 or S1 distribution.  Exam is benign with no red flag symptoms, but he does have positive neural tension testing in the left lower extremity.  Patient is currently doing PT and had a previous L4-L5 interlaminar TALYA in January which provided about 2 to 2-1/2 months of relief, so we discussed potential  treatment options.  Options ranging from conservative to interventional and finally surgical consultation were discussed, and after reviewing the options patient would like to try multimodal treatment with medication optimization, a second injection at a slightly lower level, and consultation with neurosurgery to see what his surgical treatment options might be.    PLAN:  Reviewed spine anatomy and disease process. Discussed diagnosis and treatment options with the patient today. A shared decision making model was used. The patient's values and choices were respected. The following represents what was discussed and decided upon by the provider and the patient.    1. DIAGNOSTIC TESTS  No new imaging orders at this time.    2. PHYSICAL THERAPY  Patient is already in PT or has a pending referral to begin soon.  Discussed the importance of core strengthening, ROM, stretching exercises with the patient and how each of these entities is important in decreasing pain.  Explained to the patient that the purpose of physical therapy is to teach the patient a home exercise program.  These exercises need to be performed every day in order to decrease pain and prevent future occurrences of pain.  Likened it to brushing one's teeth.      3. MEDICATIONS:  Discussed multiple medication options today with patient. Discussed risks, side effects, and proper use of medications. Patient verbalized understanding.  We will start gabapentin at night only to reduce risk of daytime drowsiness, sedation, or other cognitive side effects.  Patient advised to call our clinic's nurse navigation line (number provided) if they experience any side effects or intolerances with prescribed medication.    4. INTERVENTIONS:  Left L5-S1 Transforaminal Epidural Steroid Injection    5. OTHER REFERRALS:  A referral was placed to Neurosurgery for surgical evaluation of patient's spine, as their pain has been refractory to conservative treatment including  therapy, medications, and interventional treatment.    6. FOLLOW-UP  In approximately 2-4 weeks to assess response to injection.  Patient advised to contact our office for earlier appointment if symptoms worsening or not improving, or any side effects are noticed.    Advised patient to call the Spine Center if symptoms worsen or you have problems controlling bladder and bowel function.   ______________________________________________________________________    SUBJECTIVE:   Edward Hinds  is a 44 year old male who presents today for new patient evaluation.    Patient reports in October 2024 he started to have left-sided low back pain going down the back of the left leg.  Pain is worse with prolonged sitting or standing, and he finds lying on his back is the most comfortable position.  Pain travels down the posterior hamstring to the knee, with tingling in the ankle and foot.  No lyssa numbness or weakness, no bladder or bowel incontinence reported.    Since onset of symptoms, patient has tried several medication classes including anti-inflammatories, opioids, and muscle relaxers.  He also started physical therapy which she is currently doing, and on 1/15/2025 had an L4-L5 interlaminar TALYA with my colleague Dr. Mcmillan.  He reports the TALYA seem to be providing a good amount of benefit he reports the TLAYA seem to be providing a good amount of benefit and he felt like things were improving overall until about 2 weeks ago when symptoms started to recur.    Patient requested consultation with the spine center to have a more comprehensive discussion of his condition, prognosis, and treatment options.    No prior back surgeries    -Treatment to Date: As above      Oswestry (BRITTNEY) Questionnaire        12/31/2024     1:36 PM   OSWESTRY DISABILITY INDEX   Count 10    Sum 36    Oswestry Score (%) 72 %        Patient-reported       Neck Disability Index:       No data to display                       -Medications:    Current  Outpatient Medications   Medication Sig Dispense Refill     gabapentin (NEURONTIN) 300 MG capsule Take 1 capsule (300 mg) by mouth at bedtime. 90 capsule 0     cyclobenzaprine (FLEXERIL) 10 MG tablet Take 1 tablet (10 mg) by mouth 3 times daily as needed for muscle spasms (Patient not taking: Reported on 4/7/2025) 15 tablet 0     cyclobenzaprine (FLEXERIL) 5 MG tablet Take 1-2 tablets (5-10 mg) by mouth 3 times daily as needed for muscle spasms. (Patient not taking: Reported on 4/7/2025) 45 tablet 0     HYDROcodone-acetaminophen (NORCO) 5-325 MG tablet Take 1 tablet by mouth every 8 hours as needed for severe pain. (Patient not taking: Reported on 4/7/2025) 12 tablet 0     ibuprofen (ADVIL/MOTRIN) 200 MG tablet Take 600 mg by mouth once (Patient not taking: Reported on 4/7/2025)       methylPREDNISolone (MEDROL DOSEPAK) 4 MG tablet therapy pack Follow Package Directions (Patient not taking: Reported on 4/7/2025) 21 tablet 0     naproxen (NAPROSYN) 500 MG tablet Take 1 tablet (500 mg) by mouth 2 times daily as needed for moderate pain. (Patient not taking: Reported on 4/7/2025) 30 tablet 1     naproxen (NAPROSYN) 500 MG tablet Take 1 tablet (500 mg) by mouth 2 times daily as needed for moderate pain. (Patient not taking: Reported on 4/7/2025) 30 tablet 30     oxyCODONE (ROXICODONE) 5 MG tablet Take 1 tablet (5 mg) by mouth every 6 hours as needed for severe pain (7-10) (Patient not taking: Reported on 4/7/2025) 6 tablet 0     No current facility-administered medications for this visit.       Allergies   Allergen Reactions     Penicillin [Penicillins]      Mother says penicillin         No past medical history on file.     Patient Active Problem List   Diagnosis     CARDIOVASCULAR SCREENING; LDL GOAL LESS THAN 160     GERD (gastroesophageal reflux disease)     Acute right-sided low back pain with right-sided sciatica       No past surgical history on file.    Family History   Problem Relation Age of Onset      Hypertension Father      Alzheimer Disease Maternal Grandmother      Heart Disease Maternal Grandfather        Reviewed past medical, surgical, and family history with patient found on new patient intake packet located in EMR Media tab.     SOCIAL HX: Reviewed    ROS: Specifically negative for bowel/bladder dysfunction, balance changes, headache, dizziness, foot drop, fevers, chills, appetite changes, nausea/vomiting, unexplained weight loss. Otherwise 13 systems reviewed are negative. Please see the patient's intake questionnaire from today for details.    OBJECTIVE:  BP (!) 154/87 (BP Location: Left arm, Patient Position: Sitting, Cuff Size: Adult Regular)   Pulse 59   Ht 6' (1.829 m)   Wt 189 lb (85.7 kg)   SpO2 98%   BMI 25.63 kg/m      PHYSICAL EXAMINATION:  --CONSTITUTIONAL: Vital signs as above. No acute distress. The patient is well nourished and well groomed.  --PSYCHIATRIC: The patient is awake, alert, oriented to person, place, time and answering questions appropriately with clear speech. Appropriate mood and affect   --RESPIRATORY: Normal rhythm and effort. No abnormal accessory muscle breathing patterns noted.   --GROSS MOTOR: Easily arises from a seated position.  Gait is symmetric and narrow based  --LUMBAR SPINE: Inspection reveals no evidence of deformity. Range of motion is not limited in flexion, extension, lateral rotation. Mild tenderness to palpation of lumbar paraspinals, no tenderness in spinous processes.  Facet loading (Magallanes test) negative, supine SLR positive on the left side  --HIPS: Full range of motion bilaterally.  --LOWER EXTREMITY MOTOR TESTING:  Hip flexion left 5/5, right 5/5  Knee extension left 5/5, right 5/5  Ankle dorsiflexors left 5/5, right 5/5  Ankle plantarflexors left 5/5, right 5/5   Great toe extension left 5/5, right 5/5   Knee flexion left 5/5, right 5/5  --NEUROLOGIC: Sensation to lower extremities is intact bilaterally in L2-S1 dermatomes.  Negative Mckeon's  bilaterally. Reflexes intact in BLE, no clonus.  --VASCULAR: Warm upper limbs bilaterally. Capillary refill in the upper extremities is less than 1 second.    RESULTS: Available medical records from [facility] and any other outside records were reviewed today.     Imaging:  Available relevant imaging was personally reviewed and interpreted today. The images were shown to the patient and the findings were explained using a spine model.    12/26/2024 MRI lumbar spine:    IMPRESSION:  Multilevel degenerative changes in the lumbar spine. No  high-grade canal stenotic findings. Please see above discussion..           Again, thank you for allowing me to participate in the care of your patient.        Sincerely,        Bacilio Vo MD    Electronically signed

## 2025-04-07 NOTE — PROGRESS NOTES
ASSESSMENT:  Edward Hinds is a 44 year old male presents for consultation at the request of Edilia Vallecillo who presents today for new patient evaluation of :         Diagnoses and all orders for this visit:  Pain of left thigh  -     Spine  Referral  Lumbar radiculopathy  -     Spine  Referral  -     gabapentin (NEURONTIN) 300 MG capsule; Take 1 capsule (300 mg) by mouth at bedtime.  -     PAIN Transforaminal TALYA Inj Lumbosacral One Level Left; Future  -     Adult Neurosurgery  Referral; Future  Degeneration of intervertebral disc of lumbar region, unspecified whether pain present  -     Spine  Referral  -     gabapentin (NEURONTIN) 300 MG capsule; Take 1 capsule (300 mg) by mouth at bedtime.  -     PAIN Transforaminal TALYA Inj Lumbosacral One Level Left; Future  -     Adult Neurosurgery  Referral; Future  Chronic bilateral low back pain with left-sided sciatica  -     Spine  Referral  -     gabapentin (NEURONTIN) 300 MG capsule; Take 1 capsule (300 mg) by mouth at bedtime.  -     PAIN Transforaminal TALYA Inj Lumbosacral One Level Left; Future  -     Adult Neurosurgery  Referral; Future       Patient is neurologically intact on exam. No myelopathic or red flag symptoms.    Patient is a 44-year-old male presenting for evaluation of left-sided low back pain with left-sided lumbosacral radiculopathy approximating an L5 or S1 distribution.  Exam is benign with no red flag symptoms, but he does have positive neural tension testing in the left lower extremity.  Patient is currently doing PT and had a previous L4-L5 interlaminar TALYA in January which provided about 2 to 2-1/2 months of relief, so we discussed potential treatment options.  Options ranging from conservative to interventional and finally surgical consultation were discussed, and after reviewing the options patient would like to try multimodal treatment with medication optimization, a second  injection at a slightly lower level, and consultation with neurosurgery to see what his surgical treatment options might be.    PLAN:  Reviewed spine anatomy and disease process. Discussed diagnosis and treatment options with the patient today. A shared decision making model was used. The patient's values and choices were respected. The following represents what was discussed and decided upon by the provider and the patient.    1. DIAGNOSTIC TESTS  No new imaging orders at this time.    2. PHYSICAL THERAPY  Patient is already in PT or has a pending referral to begin soon.  Discussed the importance of core strengthening, ROM, stretching exercises with the patient and how each of these entities is important in decreasing pain.  Explained to the patient that the purpose of physical therapy is to teach the patient a home exercise program.  These exercises need to be performed every day in order to decrease pain and prevent future occurrences of pain.  Likened it to brushing one's teeth.      3. MEDICATIONS:  Discussed multiple medication options today with patient. Discussed risks, side effects, and proper use of medications. Patient verbalized understanding.  We will start gabapentin at night only to reduce risk of daytime drowsiness, sedation, or other cognitive side effects.  Patient advised to call our clinic's nurse navigation line (number provided) if they experience any side effects or intolerances with prescribed medication.    4. INTERVENTIONS:  Left L5-S1 Transforaminal Epidural Steroid Injection    5. OTHER REFERRALS:  A referral was placed to Neurosurgery for surgical evaluation of patient's spine, as their pain has been refractory to conservative treatment including therapy, medications, and interventional treatment.    6. FOLLOW-UP  In approximately 2-4 weeks to assess response to injection.  Patient advised to contact our office for earlier appointment if symptoms worsening or not improving, or any side  effects are noticed.    Advised patient to call the Spine Center if symptoms worsen or you have problems controlling bladder and bowel function.   ______________________________________________________________________    SUBJECTIVE:   Edward Hinds  is a 44 year old male who presents today for new patient evaluation.    Patient reports in October 2024 he started to have left-sided low back pain going down the back of the left leg.  Pain is worse with prolonged sitting or standing, and he finds lying on his back is the most comfortable position.  Pain travels down the posterior hamstring to the knee, with tingling in the ankle and foot.  No lyssa numbness or weakness, no bladder or bowel incontinence reported.    Since onset of symptoms, patient has tried several medication classes including anti-inflammatories, opioids, and muscle relaxers.  He also started physical therapy which she is currently doing, and on 1/15/2025 had an L4-L5 interlaminar TALYA with my colleague Dr. Mcmillan.  He reports the TALYA seem to be providing a good amount of benefit he reports the TALYA seem to be providing a good amount of benefit and he felt like things were improving overall until about 2 weeks ago when symptoms started to recur.    Patient requested consultation with the spine center to have a more comprehensive discussion of his condition, prognosis, and treatment options.    No prior back surgeries    -Treatment to Date: As above      Oswestry (BRITTNEY) Questionnaire        12/31/2024     1:36 PM   OSWESTRY DISABILITY INDEX   Count 10    Sum 36    Oswestry Score (%) 72 %        Patient-reported       Neck Disability Index:       No data to display                       -Medications:    Current Outpatient Medications   Medication Sig Dispense Refill    gabapentin (NEURONTIN) 300 MG capsule Take 1 capsule (300 mg) by mouth at bedtime. 90 capsule 0    cyclobenzaprine (FLEXERIL) 10 MG tablet Take 1 tablet (10 mg) by mouth 3 times daily as  needed for muscle spasms (Patient not taking: Reported on 4/7/2025) 15 tablet 0    cyclobenzaprine (FLEXERIL) 5 MG tablet Take 1-2 tablets (5-10 mg) by mouth 3 times daily as needed for muscle spasms. (Patient not taking: Reported on 4/7/2025) 45 tablet 0    HYDROcodone-acetaminophen (NORCO) 5-325 MG tablet Take 1 tablet by mouth every 8 hours as needed for severe pain. (Patient not taking: Reported on 4/7/2025) 12 tablet 0    ibuprofen (ADVIL/MOTRIN) 200 MG tablet Take 600 mg by mouth once (Patient not taking: Reported on 4/7/2025)      methylPREDNISolone (MEDROL DOSEPAK) 4 MG tablet therapy pack Follow Package Directions (Patient not taking: Reported on 4/7/2025) 21 tablet 0    naproxen (NAPROSYN) 500 MG tablet Take 1 tablet (500 mg) by mouth 2 times daily as needed for moderate pain. (Patient not taking: Reported on 4/7/2025) 30 tablet 1    naproxen (NAPROSYN) 500 MG tablet Take 1 tablet (500 mg) by mouth 2 times daily as needed for moderate pain. (Patient not taking: Reported on 4/7/2025) 30 tablet 30    oxyCODONE (ROXICODONE) 5 MG tablet Take 1 tablet (5 mg) by mouth every 6 hours as needed for severe pain (7-10) (Patient not taking: Reported on 4/7/2025) 6 tablet 0     No current facility-administered medications for this visit.       Allergies   Allergen Reactions    Penicillin [Penicillins]      Mother says penicillin         No past medical history on file.     Patient Active Problem List   Diagnosis    CARDIOVASCULAR SCREENING; LDL GOAL LESS THAN 160    GERD (gastroesophageal reflux disease)    Acute right-sided low back pain with right-sided sciatica       No past surgical history on file.    Family History   Problem Relation Age of Onset    Hypertension Father     Alzheimer Disease Maternal Grandmother     Heart Disease Maternal Grandfather        Reviewed past medical, surgical, and family history with patient found on new patient intake packet located in EMR Media tab.     SOCIAL HX: Reviewed    ROS:  Specifically negative for bowel/bladder dysfunction, balance changes, headache, dizziness, foot drop, fevers, chills, appetite changes, nausea/vomiting, unexplained weight loss. Otherwise 13 systems reviewed are negative. Please see the patient's intake questionnaire from today for details.    OBJECTIVE:  BP (!) 154/87 (BP Location: Left arm, Patient Position: Sitting, Cuff Size: Adult Regular)   Pulse 59   Ht 6' (1.829 m)   Wt 189 lb (85.7 kg)   SpO2 98%   BMI 25.63 kg/m      PHYSICAL EXAMINATION:  --CONSTITUTIONAL: Vital signs as above. No acute distress. The patient is well nourished and well groomed.  --PSYCHIATRIC: The patient is awake, alert, oriented to person, place, time and answering questions appropriately with clear speech. Appropriate mood and affect   --RESPIRATORY: Normal rhythm and effort. No abnormal accessory muscle breathing patterns noted.   --GROSS MOTOR: Easily arises from a seated position.  Gait is symmetric and narrow based  --LUMBAR SPINE: Inspection reveals no evidence of deformity. Range of motion is not limited in flexion, extension, lateral rotation. Mild tenderness to palpation of lumbar paraspinals, no tenderness in spinous processes.  Facet loading (Magallanes test) negative, supine SLR positive on the left side  --HIPS: Full range of motion bilaterally.  --LOWER EXTREMITY MOTOR TESTING:  Hip flexion left 5/5, right 5/5  Knee extension left 5/5, right 5/5  Ankle dorsiflexors left 5/5, right 5/5  Ankle plantarflexors left 5/5, right 5/5   Great toe extension left 5/5, right 5/5   Knee flexion left 5/5, right 5/5  --NEUROLOGIC: Sensation to lower extremities is intact bilaterally in L2-S1 dermatomes.  Negative Mckeon's bilaterally. Reflexes intact in BLE, no clonus.  --VASCULAR: Warm upper limbs bilaterally. Capillary refill in the upper extremities is less than 1 second.    RESULTS: Available medical records from [facility] and any other outside records were reviewed today.      Imaging:  Available relevant imaging was personally reviewed and interpreted today. The images were shown to the patient and the findings were explained using a spine model.    12/26/2024 MRI lumbar spine:    IMPRESSION:  Multilevel degenerative changes in the lumbar spine. No  high-grade canal stenotic findings. Please see above discussion..

## 2025-04-07 NOTE — PATIENT INSTRUCTIONS
~Spine Center Scheduling #[phone].  ~Please call our [facility] Spine Nurse Navigation #[phone] with any questions or concerns about your treatment plan, if symptoms worsen and you would like to be seen urgently, or if you have problems controlling bladder and bowel function.  ~For any future flareups or new symptoms, recommend follow-up in clinic or contact the nurse navigator line.  ~Please note that any My Chart messages may take multiple days for a response due to the high volume of patients seen in clinic.  Anything sent Friday night or after will be answered the following week when able.     An injection has been ordered today to potentially help with your pain symptoms. These injections do not fix what is going on in your back, therefore they typically do not take away the pain completely, however they can many times help improve symptoms. Injections should always be completed along with other modalities such as physical therapy for the best long term outcomes. If injections alone are done, then pain will likely return.     [facility] Injection Requirements    A  is required for all fluoroscopically-guided injections.  Injection appointments may be cancelled if there are signs/symptoms of an active infection or if the patient is being actively treated with antibiotics for a diagnosed infection.  Patients may have their steroid injection cancelled if they have had another steroid injection within 2 weeks.  Diabetic patients will have their blood glucose levels checked the day of their injection and the appointment will be rescheduled if the blood glucose level is 300 or higher.  Patients with allergies to cortisone, local anesthetics, iodine, or contrast dye should contact the Spine Center to further discuss these considerations.  Patients scheduled for medial branch block diagnostic injections should refrain from taking pain medication the day of the  procedure.  The medial branch block injection appointment will be rescheduled if the patient's pain rating is not 5/10 or greater at the time of the procedure.  Patients taking warfarin/Coumadin will have their INR checked the day of the procedure and the procedure may be rescheduled if the INR is greater than 3.0.  Please contact the Spine Center (#[phone]) if you are taking any prescription blood-thinning medications (warfarin, Plavix, Lovenox, Eliquis, Brilinta, Effient, etc.) as special dosing adjustments may need to be made depending on the type of injection you are scheduled to receive.  It is recommended that you delay having your steroid injection if you have received a flu shot or shingles vaccine within 2 weeks.     Prescribed Gabapentin today, 300 mg tablets, to be taken at night.

## 2025-05-15 ENCOUNTER — TELEPHONE (OUTPATIENT)
Dept: NEUROSURGERY | Facility: CLINIC | Age: 45
End: 2025-05-15
Payer: COMMERCIAL

## 2025-05-15 NOTE — TELEPHONE ENCOUNTER
Called to assess pain level and symptoms after injection 5/2/25 and determine if patient is interested in discussing surgery if symptoms have not improved      5/21/25  Left message that per Dr. Lara if unable to connect to discuss symptoms or schedule XR his appointment will likely be delayed.

## 2025-05-19 ENCOUNTER — OFFICE VISIT (OUTPATIENT)
Dept: PHYSICAL MEDICINE AND REHAB | Facility: CLINIC | Age: 45
End: 2025-05-19
Payer: COMMERCIAL

## 2025-05-19 VITALS
HEIGHT: 72 IN | DIASTOLIC BLOOD PRESSURE: 81 MMHG | WEIGHT: 189 LBS | SYSTOLIC BLOOD PRESSURE: 128 MMHG | OXYGEN SATURATION: 99 % | BODY MASS INDEX: 25.6 KG/M2 | HEART RATE: 59 BPM

## 2025-05-19 DIAGNOSIS — M51.369 DEGENERATION OF INTERVERTEBRAL DISC OF LUMBAR REGION, UNSPECIFIED WHETHER PAIN PRESENT: Primary | ICD-10-CM

## 2025-05-19 DIAGNOSIS — M54.16 LUMBAR RADICULOPATHY: ICD-10-CM

## 2025-05-19 PROCEDURE — 1125F AMNT PAIN NOTED PAIN PRSNT: CPT | Performed by: STUDENT IN AN ORGANIZED HEALTH CARE EDUCATION/TRAINING PROGRAM

## 2025-05-19 PROCEDURE — 99214 OFFICE O/P EST MOD 30 MIN: CPT | Performed by: STUDENT IN AN ORGANIZED HEALTH CARE EDUCATION/TRAINING PROGRAM

## 2025-05-19 PROCEDURE — 3074F SYST BP LT 130 MM HG: CPT | Performed by: STUDENT IN AN ORGANIZED HEALTH CARE EDUCATION/TRAINING PROGRAM

## 2025-05-19 PROCEDURE — 3079F DIAST BP 80-89 MM HG: CPT | Performed by: STUDENT IN AN ORGANIZED HEALTH CARE EDUCATION/TRAINING PROGRAM

## 2025-05-19 ASSESSMENT — PAIN SCALES - GENERAL: PAINLEVEL_OUTOF10: MILD PAIN (1)

## 2025-05-19 NOTE — PATIENT INSTRUCTIONS
~Spine Center Scheduling #[phone].  ~Please call our [facility] Spine Nurse Navigation #[phone] with any questions or concerns about your treatment plan, if symptoms worsen and you would like to be seen urgently, or if you have problems controlling bladder and bowel function.  ~For any future flareups or new symptoms, recommend follow-up in clinic or contact the nurse navigator line.  ~Please note that any My Chart messages may take multiple days for a response due to the high volume of patients seen in clinic.  Anything sent Friday night or after will be answered the following week when able.     Physical Therapy:  We have placed a referral for you to start physical therapy. You should be receiving a call within the next 2-4 business days to schedule your first session. If you do not hear from the PT clinic within a week please call our clinic for assistance.    Physical therapy is one of the most safe and effective treatments available for spine-related pain. Consistency is the most important factor when it comes to physical therapy - your therapist will give you exercises and stretches that you can perform at home, often without any special equipment. You should try to do those exercises at least once a day as long as they do not worsen your pain. Your therapist is trained to identify when your symptoms are worsening, but if you feel any new symptoms of numbness, weakness, or loss of bladder/bowel control please inform your therapist or call our clinic.    We will plan to follow up with you once your therapy visits are completed.

## 2025-05-19 NOTE — PROGRESS NOTES
ASSESSMENT:  Edward Hinds is a 44 year old male presents for follow-up of :         Diagnoses and all orders for this visit:  Degeneration of intervertebral disc of lumbar region, unspecified whether pain present  -     Physical Therapy  Referral; Future  Lumbar radiculopathy  -     Physical Therapy  Referral; Future       Patient is neurologically intact on exam. No myelopathic or red flag symptoms.    Patient is presenting for follow-up of his left-sided low back pain and left-sided lower lumbosacral radiculopathy which has responded fairly well to a left L5-S1 TFESI with about 80% improvement noted by the patient.  Patient and I discussed the natural duration of benefit of epidural steroid injections with the normal range being 3 to 6 months.  As the pain is better controlled now, we discussed reengaging with PT for a directional therapy program which may help continue to keep symptoms under control.  As patient has now had 2 injections and at his request from last visit, he will be meeting with a spine surgeon to review his symptoms and see if surgery is recommended.  Recommend for now that we continue the course with conservative management and await Dr. Lara's recommendation on surgical intervention.  Patient is in agreement with this plan, all questions were addressed.    PLAN:  Reviewed spine anatomy and disease process. Discussed diagnosis and treatment options with the patient today. A shared decision making model was used. The patient's values and choices were respected. The following represents what was discussed and decided upon by the provider and the patient.    1. DIAGNOSTIC TESTS  No new imaging orders at this time.    2. PHYSICAL THERAPY  Physical therapy was ordered through [facility] or the external clinic of patient's choice.  Discussed the importance of core strengthening, ROM, stretching exercises with the patient and how each of these entities is important in decreasing  pain.  Explained to the patient that the purpose of physical therapy is to teach the patient a home exercise program.  These exercises need to be performed every day in order to decrease pain and prevent future occurrences of pain.  Likened it to brushing one's teeth.      3. MEDICATIONS:  Discussed multiple medication options today with patient. Discussed risks, side effects, and proper use of medications. Patient verbalized understanding.  Patient is doing well with current gabapentin dose of 300 mg nightly, continue at current dose.  Patient advised to call our clinic's nurse navigation line (number provided) if they experience any side effects or intolerances with prescribed medication.    4. INTERVENTIONS:  Interventional treatments are not indicated for patient's presentation.  Patient is doing well after last injection, recommend continued conservative management    5. OTHER REFERRALS:  A referral was placed to Neurosurgery for surgical evaluation of patient's spine, as their pain has been refractory to conservative treatment including therapy, medications, and interventional treatment.    6. FOLLOW-UP  After completion of physical therapy.  Patient advised to contact our office for earlier appointment if symptoms worsening or not improving, or any side effects are noticed.    Advised patient to call the Spine Center if symptoms worsen or you have problems controlling bladder and bowel function.   ______________________________________________________________________    SUBJECTIVE:   Edward Hinds  is a 44 year old male who presents today for follow-up evaluation.  He presents today with his spouse who helped provide collateral information.    Patient reports the left L5-S1 TFESI performed on 5/2/2025 has yielded about 80% relief of his low back and leg pain.  Patient had several concerns and questions about his prognosis and next steps for treatment, which were answered in turn.    Patient reports that he  is continuing to use the gabapentin and on 1 day that he did not take it he did notice an increase in pain so he feels like he is still relying on it.  He is also concerned about how it is masking symptoms and he does not want to be on it long-term, which I told patient I am in agreement on that.    No lyssa numbness or weakness, no bladder or bowel incontinence reported.    Patient will be meeting with Dr. Lara next week to discuss potential surgical options.    No prior back surgeries    -Treatment to Date: As above      Oswestry (BRITTNEY) Questionnaire        12/31/2024     1:36 PM   OSWESTRY DISABILITY INDEX   Count 10    Sum 36    Oswestry Score (%) 72 %        Patient-reported       Neck Disability Index:       No data to display                       -Medications:    Current Outpatient Medications   Medication Sig Dispense Refill    cyclobenzaprine (FLEXERIL) 10 MG tablet Take 1 tablet (10 mg) by mouth 3 times daily as needed for muscle spasms 15 tablet 0    cyclobenzaprine (FLEXERIL) 5 MG tablet Take 1-2 tablets (5-10 mg) by mouth 3 times daily as needed for muscle spasms. 45 tablet 0    gabapentin (NEURONTIN) 300 MG capsule Take 1 capsule (300 mg) by mouth at bedtime. 90 capsule 0    HYDROcodone-acetaminophen (NORCO) 5-325 MG tablet Take 1 tablet by mouth every 8 hours as needed for severe pain. 12 tablet 0    ibuprofen (ADVIL/MOTRIN) 200 MG tablet Take 600 mg by mouth once.      methylPREDNISolone (MEDROL DOSEPAK) 4 MG tablet therapy pack Follow Package Directions 21 tablet 0    naproxen (NAPROSYN) 500 MG tablet Take 1 tablet (500 mg) by mouth 2 times daily as needed for moderate pain. 30 tablet 1    naproxen (NAPROSYN) 500 MG tablet Take 1 tablet (500 mg) by mouth 2 times daily as needed for moderate pain. 30 tablet 30    oxyCODONE (ROXICODONE) 5 MG tablet Take 1 tablet (5 mg) by mouth every 6 hours as needed for severe pain (7-10) 6 tablet 0     No current facility-administered medications for this visit.        Allergies   Allergen Reactions    Penicillin [Penicillins]      Mother says penicillin         No past medical history on file.     Patient Active Problem List   Diagnosis    CARDIOVASCULAR SCREENING; LDL GOAL LESS THAN 160    GERD (gastroesophageal reflux disease)    Acute right-sided low back pain with right-sided sciatica       No past surgical history on file.    Family History   Problem Relation Age of Onset    Hypertension Father     Alzheimer Disease Maternal Grandmother     Heart Disease Maternal Grandfather        Reviewed past medical, surgical, and family history with patient found on new patient intake packet located in EMR Media tab.     SOCIAL HX: Reviewed    ROS: Specifically negative for bowel/bladder dysfunction, balance changes, headache, dizziness, foot drop, fevers, chills, appetite changes, nausea/vomiting, unexplained weight loss. Otherwise 13 systems reviewed are negative. Please see the patient's intake questionnaire from today for details.    OBJECTIVE:  /81 (BP Location: Left arm, Patient Position: Sitting, Cuff Size: Adult Regular)   Pulse 59   Ht 6' (1.829 m)   Wt 189 lb (85.7 kg)   SpO2 99%   BMI 25.63 kg/m      PHYSICAL EXAMINATION: Exam updated below  --CONSTITUTIONAL: Vital signs as above. No acute distress. The patient is well nourished and well groomed.  --PSYCHIATRIC: The patient is awake, alert, oriented to person, place, time and answering questions appropriately with clear speech. Appropriate mood and affect   --RESPIRATORY: Normal rhythm and effort. No abnormal accessory muscle breathing patterns noted.   --GROSS MOTOR: Easily arises from a seated position.  Gait is symmetric and narrow based  --LUMBAR SPINE: Inspection reveals no evidence of deformity. Range of motion is not limited in flexion, extension, lateral rotation. Mild tenderness to palpation of lumbar paraspinals, no tenderness in spinous processes.  Facet loading (Magallanes test) negative, seated SLR  negative  --HIPS: Full range of motion bilaterally.  --LOWER EXTREMITY MOTOR TESTING:  Hip flexion left 5/5, right 5/5  Knee extension left 5/5, right 5/5  Ankle dorsiflexors left 5/5, right 5/5  Ankle plantarflexors left 5/5, right 5/5   Great toe extension left 5/5, right 5/5   Knee flexion left 5/5, right 5/5  --NEUROLOGIC: Sensation to lower extremities is intact bilaterally in L2-S1 dermatomes.  Negative Mckeon's bilaterally. Reflexes intact in BLE, no clonus.  --VASCULAR: Warm upper limbs bilaterally. Capillary refill in the upper extremities is less than 1 second.    RESULTS: Available medical records from [facility] and any other outside records were reviewed today.     Imaging:  Available relevant imaging was personally reviewed and interpreted today. The images were shown to the patient and the findings were explained using a spine model.    12/26/2024 MRI lumbar spine:    IMPRESSION:  Multilevel degenerative changes in the lumbar spine. No  high-grade canal stenotic findings. Please see above discussion..

## 2025-05-19 NOTE — LETTER
5/19/2025      Edward Hinds  [id number] [facility] [address]      Dear Colleague,    Thank you for referring your patient, Edward Hinds, to the [facility] SPINE [facility]. Please see a copy of my visit note below.    ASSESSMENT:  Edward Hinds is a 44 year old male presents for follow-up of :         Diagnoses and all orders for this visit:  Degeneration of intervertebral disc of lumbar region, unspecified whether pain present  -     Physical Therapy  Referral; Future  Lumbar radiculopathy  -     Physical Therapy  Referral; Future       Patient is neurologically intact on exam. No myelopathic or red flag symptoms.    Patient is presenting for follow-up of his left-sided low back pain and left-sided lower lumbosacral radiculopathy which has responded fairly well to a left L5-S1 TFESI with about 80% improvement noted by the patient.  Patient and I discussed the natural duration of benefit of epidural steroid injections with the normal range being 3 to 6 months.  As the pain is better controlled now, we discussed reengaging with PT for a directional therapy program which may help continue to keep symptoms under control.  As patient has now had 2 injections and at his request from last visit, he will be meeting with a spine surgeon to review his symptoms and see if surgery is recommended.  Recommend for now that we continue the course with conservative management and await Dr. Lara's recommendation on surgical intervention.  Patient is in agreement with this plan, all questions were addressed.    PLAN:  Reviewed spine anatomy and disease process. Discussed diagnosis and treatment options with the patient today. A shared decision making model was used. The patient's values and choices were respected. The following represents what was discussed and decided upon by the provider and the patient.    1. DIAGNOSTIC TESTS  No new imaging orders at this time.    2. PHYSICAL  THERAPY  Physical therapy was ordered through [facility] or the external clinic of patient's choice.  Discussed the importance of core strengthening, ROM, stretching exercises with the patient and how each of these entities is important in decreasing pain.  Explained to the patient that the purpose of physical therapy is to teach the patient a home exercise program.  These exercises need to be performed every day in order to decrease pain and prevent future occurrences of pain.  Likened it to brushing one's teeth.      3. MEDICATIONS:  Discussed multiple medication options today with patient. Discussed risks, side effects, and proper use of medications. Patient verbalized understanding.  Patient is doing well with current gabapentin dose of 300 mg nightly, continue at current dose.  Patient advised to call our clinic's nurse navigation line (number provided) if they experience any side effects or intolerances with prescribed medication.    4. INTERVENTIONS:  Interventional treatments are not indicated for patient's presentation.  Patient is doing well after last injection, recommend continued conservative management    5. OTHER REFERRALS:  A referral was placed to Neurosurgery for surgical evaluation of patient's spine, as their pain has been refractory to conservative treatment including therapy, medications, and interventional treatment.    6. FOLLOW-UP  After completion of physical therapy.  Patient advised to contact our office for earlier appointment if symptoms worsening or not improving, or any side effects are noticed.    Advised patient to call the Spine Center if symptoms worsen or you have problems controlling bladder and bowel function.   ______________________________________________________________________    SUBJECTIVE:   Edward Hinds  is a 44 year old male who presents today for follow-up evaluation.  He presents today with his spouse who helped provide collateral information.    Patient reports the  left L5-S1 TFESI performed on 5/2/2025 has yielded about 80% relief of his low back and leg pain.  Patient had several concerns and questions about his prognosis and next steps for treatment, which were answered in turn.    Patient reports that he is continuing to use the gabapentin and on 1 day that he did not take it he did notice an increase in pain so he feels like he is still relying on it.  He is also concerned about how it is masking symptoms and he does not want to be on it long-term, which I told patient I am in agreement on that.    No lyssa numbness or weakness, no bladder or bowel incontinence reported.    Patient will be meeting with Dr. Lara next week to discuss potential surgical options.    No prior back surgeries    -Treatment to Date: As above      Oswestry (BRITTNEY) Questionnaire        12/31/2024     1:36 PM   OSWESTRY DISABILITY INDEX   Count 10    Sum 36    Oswestry Score (%) 72 %        Patient-reported       Neck Disability Index:       No data to display                       -Medications:    Current Outpatient Medications   Medication Sig Dispense Refill     cyclobenzaprine (FLEXERIL) 10 MG tablet Take 1 tablet (10 mg) by mouth 3 times daily as needed for muscle spasms 15 tablet 0     cyclobenzaprine (FLEXERIL) 5 MG tablet Take 1-2 tablets (5-10 mg) by mouth 3 times daily as needed for muscle spasms. 45 tablet 0     gabapentin (NEURONTIN) 300 MG capsule Take 1 capsule (300 mg) by mouth at bedtime. 90 capsule 0     HYDROcodone-acetaminophen (NORCO) 5-325 MG tablet Take 1 tablet by mouth every 8 hours as needed for severe pain. 12 tablet 0     ibuprofen (ADVIL/MOTRIN) 200 MG tablet Take 600 mg by mouth once.       methylPREDNISolone (MEDROL DOSEPAK) 4 MG tablet therapy pack Follow Package Directions 21 tablet 0     naproxen (NAPROSYN) 500 MG tablet Take 1 tablet (500 mg) by mouth 2 times daily as needed for moderate pain. 30 tablet 1     naproxen (NAPROSYN) 500 MG tablet Take 1 tablet (500 mg)  by mouth 2 times daily as needed for moderate pain. 30 tablet 30     oxyCODONE (ROXICODONE) 5 MG tablet Take 1 tablet (5 mg) by mouth every 6 hours as needed for severe pain (7-10) 6 tablet 0     No current facility-administered medications for this visit.       Allergies   Allergen Reactions     Penicillin [Penicillins]      Mother says penicillin         No past medical history on file.     Patient Active Problem List   Diagnosis     CARDIOVASCULAR SCREENING; LDL GOAL LESS THAN 160     GERD (gastroesophageal reflux disease)     Acute right-sided low back pain with right-sided sciatica       No past surgical history on file.    Family History   Problem Relation Age of Onset     Hypertension Father      Alzheimer Disease Maternal Grandmother      Heart Disease Maternal Grandfather        Reviewed past medical, surgical, and family history with patient found on new patient intake packet located in EMR Media tab.     SOCIAL HX: Reviewed    ROS: Specifically negative for bowel/bladder dysfunction, balance changes, headache, dizziness, foot drop, fevers, chills, appetite changes, nausea/vomiting, unexplained weight loss. Otherwise 13 systems reviewed are negative. Please see the patient's intake questionnaire from today for details.    OBJECTIVE:  /81 (BP Location: Left arm, Patient Position: Sitting, Cuff Size: Adult Regular)   Pulse 59   Ht 6' (1.829 m)   Wt 189 lb (85.7 kg)   SpO2 99%   BMI 25.63 kg/m      PHYSICAL EXAMINATION: Exam updated below  --CONSTITUTIONAL: Vital signs as above. No acute distress. The patient is well nourished and well groomed.  --PSYCHIATRIC: The patient is awake, alert, oriented to person, place, time and answering questions appropriately with clear speech. Appropriate mood and affect   --RESPIRATORY: Normal rhythm and effort. No abnormal accessory muscle breathing patterns noted.   --GROSS MOTOR: Easily arises from a seated position.  Gait is symmetric and narrow based  --LUMBAR  SPINE: Inspection reveals no evidence of deformity. Range of motion is not limited in flexion, extension, lateral rotation. Mild tenderness to palpation of lumbar paraspinals, no tenderness in spinous processes.  Facet loading (Magallanes test) negative, seated SLR negative  --HIPS: Full range of motion bilaterally.  --LOWER EXTREMITY MOTOR TESTING:  Hip flexion left 5/5, right 5/5  Knee extension left 5/5, right 5/5  Ankle dorsiflexors left 5/5, right 5/5  Ankle plantarflexors left 5/5, right 5/5   Great toe extension left 5/5, right 5/5   Knee flexion left 5/5, right 5/5  --NEUROLOGIC: Sensation to lower extremities is intact bilaterally in L2-S1 dermatomes.  Negative Mckeon's bilaterally. Reflexes intact in BLE, no clonus.  --VASCULAR: Warm upper limbs bilaterally. Capillary refill in the upper extremities is less than 1 second.    RESULTS: Available medical records from [facility] and any other outside records were reviewed today.     Imaging:  Available relevant imaging was personally reviewed and interpreted today. The images were shown to the patient and the findings were explained using a spine model.    12/26/2024 MRI lumbar spine:    IMPRESSION:  Multilevel degenerative changes in the lumbar spine. No  high-grade canal stenotic findings. Please see above discussion..         Again, thank you for allowing me to participate in the care of your patient.        Sincerely,        Bacilio Vo MD    Electronically signed

## 2025-05-20 ENCOUNTER — TELEPHONE (OUTPATIENT)
Dept: NEUROSURGERY | Facility: CLINIC | Age: 45
End: 2025-05-20
Payer: COMMERCIAL

## 2025-05-20 DIAGNOSIS — M54.16 LUMBAR RADICULOPATHY: Primary | ICD-10-CM

## 2025-05-20 NOTE — TELEPHONE ENCOUNTER
Date: May 20, 2025    Provider: Other     Provider/Other: Radiology Department    Reason for out-going call: ORDERS: CASE REQUEST/IMAGING/ INJECTION/ PROCEDURES       Detailed message: Left voicemail to schedule XR 4 view prior to appointment on 5/27 with Dr. Lara.           Number provider for patient: [facility]: [phone]

## 2025-05-23 ENCOUNTER — HOSPITAL ENCOUNTER (OUTPATIENT)
Dept: GENERAL RADIOLOGY | Facility: CLINIC | Age: 45
Discharge: HOME | End: 2025-05-23
Attending: SURGERY | Admitting: SURGERY
Payer: COMMERCIAL

## 2025-05-23 DIAGNOSIS — M54.16 LUMBAR RADICULOPATHY: ICD-10-CM

## 2025-05-23 PROCEDURE — 72110 X-RAY EXAM L-2 SPINE 4/>VWS: CPT

## 2025-05-27 ENCOUNTER — THERAPY VISIT (OUTPATIENT)
Dept: PHYSICAL THERAPY | Facility: CLINIC | Age: 45
End: 2025-05-27
Attending: STUDENT IN AN ORGANIZED HEALTH CARE EDUCATION/TRAINING PROGRAM
Payer: COMMERCIAL

## 2025-05-27 ENCOUNTER — OFFICE VISIT (OUTPATIENT)
Dept: NEUROSURGERY | Facility: CLINIC | Age: 45
End: 2025-05-27
Attending: STUDENT IN AN ORGANIZED HEALTH CARE EDUCATION/TRAINING PROGRAM
Payer: COMMERCIAL

## 2025-05-27 VITALS
OXYGEN SATURATION: 99 % | SYSTOLIC BLOOD PRESSURE: 119 MMHG | DIASTOLIC BLOOD PRESSURE: 75 MMHG | BODY MASS INDEX: 25.56 KG/M2 | WEIGHT: 188.7 LBS | HEART RATE: 56 BPM | HEIGHT: 72 IN

## 2025-05-27 DIAGNOSIS — M51.369 DEGENERATION OF INTERVERTEBRAL DISC OF LUMBAR REGION, UNSPECIFIED WHETHER PAIN PRESENT: ICD-10-CM

## 2025-05-27 DIAGNOSIS — M54.42 CHRONIC BILATERAL LOW BACK PAIN WITH LEFT-SIDED SCIATICA: ICD-10-CM

## 2025-05-27 DIAGNOSIS — G89.29 CHRONIC BILATERAL LOW BACK PAIN WITH LEFT-SIDED SCIATICA: ICD-10-CM

## 2025-05-27 DIAGNOSIS — M54.41 ACUTE RIGHT-SIDED LOW BACK PAIN WITH RIGHT-SIDED SCIATICA: Primary | ICD-10-CM

## 2025-05-27 DIAGNOSIS — M54.16 LUMBAR RADICULOPATHY: Primary | ICD-10-CM

## 2025-05-27 DIAGNOSIS — M54.16 LUMBAR RADICULOPATHY: ICD-10-CM

## 2025-05-27 PROCEDURE — 97161 PT EVAL LOW COMPLEX 20 MIN: CPT | Mod: GP

## 2025-05-27 PROCEDURE — 3074F SYST BP LT 130 MM HG: CPT | Performed by: SURGERY

## 2025-05-27 PROCEDURE — 97110 THERAPEUTIC EXERCISES: CPT | Mod: GP

## 2025-05-27 PROCEDURE — 99203 OFFICE O/P NEW LOW 30 MIN: CPT | Performed by: SURGERY

## 2025-05-27 PROCEDURE — 3078F DIAST BP <80 MM HG: CPT | Performed by: SURGERY

## 2025-05-27 PROCEDURE — 1125F AMNT PAIN NOTED PAIN PRSNT: CPT | Performed by: SURGERY

## 2025-05-27 ASSESSMENT — PAIN SCALES - GENERAL: PAINLEVEL_OUTOF10: MILD PAIN (2)

## 2025-05-27 NOTE — PROGRESS NOTES
NEUROSURGERY CONSULTATION NOTE      Neurosurgery was asked to see this patient by Bacilio Vo MD for evaluation of lumbar radiculopathy.       CONSULTATION ASSESSMENT AND PLAN:    Symptoms of lumbar radiculopathy have improved with conservative management, including gabapentin and physical therapy. No current symptoms in the left leg. Some right foot tingling. Imaging shows near resolution of a previous disc herniation from 2022.  Current imaging does not show severe nerve impingement, and conservative management has been effective. Continue with physical therapy and start core strengthening exercises gradually. Stop gabapentin while monitoring symptoms. Reassess if symptoms return or worsen.    Joceline Lara MD      HISTORY OF PRESENTING ILLNESS:    Edward Hinds, a 44-year-old male, has been experiencing pain in his right hip, which he attributes to playing soccer recently. Prior to this, he did not have soreness in that area. He also reported left-sided low back pain with leg pain down his leg, but currently, the symptoms are more pronounced on the right side, with tingling and numbness at the base of the right foot. He mentioned that the left leg symptoms have resolved. In January 2025, he received his first shot, which was more effective on the left side, and a second shot approximately three weeks ago, which seemed to affect the right side more. He underwent physical therapy with four sessions in January 2025, which he discontinued after feeling better. However, he returned in March 2025 due to persistent issues. He expressed concern about the effectiveness of gabapentin, questioning whether it is masking his symptoms. He had similar symptoms in December 2023, primarily on the right side, which led to a period of significant disability. During this time, he rested extensively, lying on the floor daily and unable to sit up in a car, resulting in a four-week absence from work. He fears a recurrence of  this disabling situation. Edward has had several MRIs over the years, revealing a significant disc herniation in 2022, which mostly resolved by 2023 without surgery. He has been cautious about physical activity since the December 2023 episode, feeling gun-shy and wanting to return to a more active lifestyle. He recently started physical therapy again but has not consistently engaged in core strengthening exercises.     No past medical history on file.    No past surgical history on file.    REVIEW OF SYSTEMS:  See ROS form under media     MEDICATIONS:    Current Outpatient Medications   Medication Sig Dispense Refill    cyclobenzaprine (FLEXERIL) 10 MG tablet Take 1 tablet (10 mg) by mouth 3 times daily as needed for muscle spasms (Patient not taking: Reported on 5/27/2025) 15 tablet 0    cyclobenzaprine (FLEXERIL) 5 MG tablet Take 1-2 tablets (5-10 mg) by mouth 3 times daily as needed for muscle spasms. (Patient not taking: Reported on 5/27/2025) 45 tablet 0    gabapentin (NEURONTIN) 300 MG capsule Take 1 capsule (300 mg) by mouth at bedtime. 90 capsule 0    HYDROcodone-acetaminophen (NORCO) 5-325 MG tablet Take 1 tablet by mouth every 8 hours as needed for severe pain. (Patient not taking: Reported on 5/27/2025) 12 tablet 0    ibuprofen (ADVIL/MOTRIN) 200 MG tablet Take 600 mg by mouth once.      methylPREDNISolone (MEDROL DOSEPAK) 4 MG tablet therapy pack Follow Package Directions (Patient not taking: Reported on 5/27/2025) 21 tablet 0    naproxen (NAPROSYN) 500 MG tablet Take 1 tablet (500 mg) by mouth 2 times daily as needed for moderate pain. (Patient not taking: Reported on 5/27/2025) 30 tablet 1    naproxen (NAPROSYN) 500 MG tablet Take 1 tablet (500 mg) by mouth 2 times daily as needed for moderate pain. (Patient not taking: Reported on 5/27/2025) 30 tablet 30    oxyCODONE (ROXICODONE) 5 MG tablet Take 1 tablet (5 mg) by mouth every 6 hours as needed for severe pain (7-10) (Patient not taking: Reported on  5/27/2025) 6 tablet 0         ALLERGIES/SENSITIVITIES:     Allergies   Allergen Reactions    Penicillin [Penicillins]      Mother says penicillin         PERTINENT SOCIAL HISTORY:   Social History     Socioeconomic History    Marital status:      Spouse name: None    Number of children: None    Years of education: None    Highest education level: None   Tobacco Use    Smoking status: Former     Current packs/day: 0.50     Average packs/day: 0.5 packs/day for 12.4 years (6.2 ttl pk-yrs)     Types: Cigarettes     Start date: 12/21/2013    Smokeless tobacco: Never   Substance and Sexual Activity    Alcohol use: Yes     Comment: rare    Drug use: No    Sexual activity: Yes     Partners: Female   Other Topics Concern    Parent/sibling w/ CABG, MI or angioplasty before 65F 55M? No     Social Drivers of Health     Interpersonal Safety: Low Risk  (5/27/2025)    Interpersonal Safety     Do you feel physically and emotionally safe where you currently live?: Yes     Within the past 12 months, have you been hit, slapped, kicked or otherwise physically hurt by someone?: No     Within the past 12 months, have you been humiliated or emotionally abused in other ways by your partner or ex-partner?: No         FAMILY HISTORY:  Family History   Problem Relation Age of Onset    Hypertension Father     Alzheimer Disease Maternal Grandmother     Heart Disease Maternal Grandfather         PHYSICAL EXAM:   Constitutional: /75   Pulse 56   Ht 1.829 m (6')   Wt 85.6 kg (188 lb 11.2 oz)   SpO2 99%   BMI 25.59 kg/m       Mental Status: A & O in no acute distress.  Affect is appropriate.  Speech is fluent.  Recent and remote memory are intact.  Attention span and concentration are normal.     Motor:  Normal bulk and tone all muscle groups of upper and lower extremities.    Strength: 5/5 in LE      Sensory: Sensation intact bilaterally to light touch in LE      Reflexes;knee/ ankle jerk 1+    IMAGING:  I personally reviewed all  radiographic images         Cc:   No Ref-Primary, Physician

## 2025-05-27 NOTE — PROGRESS NOTES
PHYSICAL THERAPY EVALUATION  Type of Visit: Evaluation       Fall Risk Screen:  Have you fallen 2 or more times in the past year?: No  Have you fallen and had an injury in the past year?: No    Subjective   Patient is a 44 year old male that presents to PT with low back pain.  Patient has had few stints of PT in the past for same diagnosis with the last stint being about 5 months ago.  Patient states that pain has gotten a little bit better since previous stint of PT but believes due to cortisone shots and gabapentin. Last cortisone shot being about a month ago. Still gets some increased achiness and low back pain after running, walking longer distances and picking up heavier of over 50 pounds. Wanting to focus on directional preference activities.         Presenting condition or subjective complaint: physical therapy ordered by physician  Date of onset: 05/19/25    Relevant medical history:     Dates & types of surgery:      Prior diagnostic imaging/testing results: MRI; X-ray     Prior therapy history for the same diagnosis, illness or injury: Yes january 2025      Living Environment  Social support: With a significant other or spouse   Type of home: House; Multi-level   Stairs to enter the home: Yes 30 Is there a railing: Yes     Ramp: No   Stairs inside the home: Yes 30 Is there a railing: Yes     Help at home: Home management tasks (cooking, cleaning); Home and Yard maintenance tasks  Equipment owned:       Employment: Yes   Hobbies/Interests: reading    Patient goals for therapy: continue getting better with amount of physicl activity    Pain assessment:    Patient states that pain at best is a 1/10 and at its worst is a 7-8/10. Patient states that the pain is primarily located in low back pain with intermittent pain going down the leg.      Objective   LUMBAR SPINE EVALUATION  INTEGUMENTARY (edema, incisions): WNL    POSTURE: Slight increase in lean towards the right side intermittently  but then sitting with neutral position     GAIT:   Weightbearing Status: WBAT  Assistive Device(s): None  Gait Deviations: WNL    BALANCE/PROPRIOCEPTION: WNL, WFL    ROM:   (Degrees) Left AROM Right AROM   Hip Flexion WNL WNL   Hip Abduction WNL WNL   Hip Adduction WNL WNL   Lumbar Flexion Able to get to about shins with slight pull   Lumbar Extension WNL   Knee:   Flexion: WNL bilaterally   Extension: WNL bilaterally    STRENGTH:   Pain: - none + mild ++ moderate +++ severe  Strength Scale: 0-5/5 Left Right   Hip Flexion 5 4   Hip Extension 5 4   Hip Abduction 5 4+   Hip Adduction 5 4+   Knee Flexion 5 5   Knee Extension 5 5     MYOTOMES: WNL  DTR S: WNL  CORD SIGNS: WNL  DERMATOMES: WNL    LUMBAR/HIP Special Tests:    Left Right   ANGIE Negative  Positive   FADIR/Labrum/ANDREWS Negative  Negative    SLR Negative  Negative          Assessment & Plan   CLINICAL IMPRESSIONS  Medical Diagnosis: Degeneration of intervertebral disc of lumbar region, unspecified whether pain present, Lumbar radiculopathy    Treatment Diagnosis: Decreased activity tolerance, slight weakness on R LE   Impression/Assessment: Patient is a 44 year old male with low back pain complaints.  The following significant findings have been identified: Pain, Decreased ROM/flexibility, Decreased joint mobility, Decreased strength, Impaired balance, Impaired muscle performance, Decreased activity tolerance, and Impaired posture. These impairments interfere with their ability to perform work tasks, recreational activities, household chores, household mobility, and community mobility as compared to previous level of function.     Clinical Decision Making (Complexity):  Clinical Presentation: Stable/Uncomplicated  Clinical Presentation Rationale: based on medical and personal factors listed in PT evaluation  Clinical Decision Making (Complexity): Low complexity    PLAN OF CARE  Treatment Interventions:  Modalities: Cryotherapy, E-stim, Hot Pack,  Ultrasound  Interventions: Gait Training, Manual Therapy, Neuromuscular Re-education, Therapeutic Activity, Therapeutic Exercise    Long Term Goals     PT Goal 1  Goal Identifier: HEP  Goal Description: Patient will demonstrate independent compliance with HEP > 4 days per weeks to maximize outcomes for increased functional activity.  Target Date: 08/19/25  PT Goal 2  Goal Identifier: Symptom Management  Goal Description: Patient will use strategies learned in PT  to decrease symptoms by 50% helping to reach personal goal of symptom relief to increase tolerance to walking and standing for longer periods of time  Rationale: to maximize safety and independence with performance of ADLs and functional tasks;to maximize safety and independence within the home;to maximize safety and independence within the community  Target Date: 08/19/25  PT Goal 3  Goal Description: Patient will self report return to all physical activities without increase in pain  Rationale: to maximize safety and independence with performance of ADLs and functional tasks;to maximize safety and independence within the community;to maximize safety and independence within the home  Target Date: 08/19/25      Frequency of Treatment: 1x per week  Duration of Treatment: 12 weeks    Recommended Referrals to Other Professionals: None  Education Assessment:   Learner/Method: Patient;Listening;Reading;Demonstration;Pictures/Video    Risks and benefits of evaluation/treatment have been explained.   Patient/Family/caregiver agrees with Plan of Care.     Evaluation Time:     PT Eval, Low Complexity Minutes (06800): 30       Signing Clinician: Janelle Marks PT

## 2025-05-27 NOTE — PATIENT INSTRUCTIONS
Symptoms of lumbar radiculopathy have improved with conservative management, including gabapentin and physical therapy. No current symptoms in the left leg. Some right foot tingling. Imaging shows near resolution of a previous disc herniation from 2022.  Current imaging does not show severe issues, and conservative management has been effective. Continue with physical therapy and start core strengthening exercises gradually. Stop gabapentin while monitoring symptoms. Reassess if symptoms return or worsen.

## 2025-05-27 NOTE — LETTER
5/27/2025      Edward Hinds  [id number] [facility] [address]      Dear Colleague,    Thank you for referring your patient, Edward Hinds, to the [facility]. Please see a copy of my visit note below.    NEUROSURGERY CONSULTATION NOTE      Neurosurgery was asked to see this patient by Bacilio Vo MD for evaluation of lumbar radiculopathy.       CONSULTATION ASSESSMENT AND PLAN:    Symptoms of lumbar radiculopathy have improved with conservative management, including gabapentin and physical therapy. No current symptoms in the left leg. Some right foot tingling. Imaging shows near resolution of a previous disc herniation from 2022.  Current imaging does not show severe nerve impingement, and conservative management has been effective. Continue with physical therapy and start core strengthening exercises gradually. Stop gabapentin while monitoring symptoms. Reassess if symptoms return or worsen.    Joceline Lara MD      HISTORY OF PRESENTING ILLNESS:    Edward Hinds, a 44-year-old male, has been experiencing pain in his right hip, which he attributes to playing soccer recently. Prior to this, he did not have soreness in that area. He also reported left-sided low back pain with leg pain down his leg, but currently, the symptoms are more pronounced on the right side, with tingling and numbness at the base of the right foot. He mentioned that the left leg symptoms have resolved. In January 2025, he received his first shot, which was more effective on the left side, and a second shot approximately three weeks ago, which seemed to affect the right side more. He underwent physical therapy with four sessions in January 2025, which he discontinued after feeling better. However, he returned in March 2025 due to persistent issues. He expressed concern about the effectiveness of gabapentin, questioning whether it is masking his symptoms. He had similar symptoms in December 2023,  primarily on the right side, which led to a period of significant disability. During this time, he rested extensively, lying on the floor daily and unable to sit up in a car, resulting in a four-week absence from work. He fears a recurrence of this disabling situation. Edward has had several MRIs over the years, revealing a significant disc herniation in 2022, which mostly resolved by 2023 without surgery. He has been cautious about physical activity since the December 2023 episode, feeling gun-shy and wanting to return to a more active lifestyle. He recently started physical therapy again but has not consistently engaged in core strengthening exercises.     No past medical history on file.    No past surgical history on file.    REVIEW OF SYSTEMS:  See ROS form under media     MEDICATIONS:    Current Outpatient Medications   Medication Sig Dispense Refill     cyclobenzaprine (FLEXERIL) 10 MG tablet Take 1 tablet (10 mg) by mouth 3 times daily as needed for muscle spasms (Patient not taking: Reported on 5/27/2025) 15 tablet 0     cyclobenzaprine (FLEXERIL) 5 MG tablet Take 1-2 tablets (5-10 mg) by mouth 3 times daily as needed for muscle spasms. (Patient not taking: Reported on 5/27/2025) 45 tablet 0     gabapentin (NEURONTIN) 300 MG capsule Take 1 capsule (300 mg) by mouth at bedtime. 90 capsule 0     HYDROcodone-acetaminophen (NORCO) 5-325 MG tablet Take 1 tablet by mouth every 8 hours as needed for severe pain. (Patient not taking: Reported on 5/27/2025) 12 tablet 0     ibuprofen (ADVIL/MOTRIN) 200 MG tablet Take 600 mg by mouth once.       methylPREDNISolone (MEDROL DOSEPAK) 4 MG tablet therapy pack Follow Package Directions (Patient not taking: Reported on 5/27/2025) 21 tablet 0     naproxen (NAPROSYN) 500 MG tablet Take 1 tablet (500 mg) by mouth 2 times daily as needed for moderate pain. (Patient not taking: Reported on 5/27/2025) 30 tablet 1     naproxen (NAPROSYN) 500 MG tablet Take 1 tablet (500 mg) by  mouth 2 times daily as needed for moderate pain. (Patient not taking: Reported on 5/27/2025) 30 tablet 30     oxyCODONE (ROXICODONE) 5 MG tablet Take 1 tablet (5 mg) by mouth every 6 hours as needed for severe pain (7-10) (Patient not taking: Reported on 5/27/2025) 6 tablet 0         ALLERGIES/SENSITIVITIES:     Allergies   Allergen Reactions     Penicillin [Penicillins]      Mother says penicillin         PERTINENT SOCIAL HISTORY:   Social History     Socioeconomic History     Marital status:      Spouse name: None     Number of children: None     Years of education: None     Highest education level: None   Tobacco Use     Smoking status: Former     Current packs/day: 0.50     Average packs/day: 0.5 packs/day for 12.4 years (6.2 ttl pk-yrs)     Types: Cigarettes     Start date: 12/21/2013     Smokeless tobacco: Never   Substance and Sexual Activity     Alcohol use: Yes     Comment: rare     Drug use: No     Sexual activity: Yes     Partners: Female   Other Topics Concern     Parent/sibling w/ CABG, MI or angioplasty before 65F 55M? No     Social Drivers of Health     Interpersonal Safety: Low Risk  (5/27/2025)    Interpersonal Safety      Do you feel physically and emotionally safe where you currently live?: Yes      Within the past 12 months, have you been hit, slapped, kicked or otherwise physically hurt by someone?: No      Within the past 12 months, have you been humiliated or emotionally abused in other ways by your partner or ex-partner?: No         FAMILY HISTORY:  Family History   Problem Relation Age of Onset     Hypertension Father      Alzheimer Disease Maternal Grandmother      Heart Disease Maternal Grandfather         PHYSICAL EXAM:   Constitutional: /75   Pulse 56   Ht 1.829 m (6')   Wt 85.6 kg (188 lb 11.2 oz)   SpO2 99%   BMI 25.59 kg/m       Mental Status: A & O in no acute distress.  Affect is appropriate.  Speech is fluent.  Recent and remote memory are intact.  Attention span  and concentration are normal.     Motor:  Normal bulk and tone all muscle groups of upper and lower extremities.    Strength: 5/5 in LE      Sensory: Sensation intact bilaterally to light touch in LE      Reflexes;knee/ ankle jerk 1+    IMAGING:  I personally reviewed all radiographic images         Cc:   No Ref-Primary, Physician             Again, thank you for allowing me to participate in the care of your patient.        Sincerely,        Joceline Lara MD    Electronically signed

## 2025-05-27 NOTE — NURSING NOTE
Edward Hinds is a 44 year old male who presents for:  Chief Complaint   Patient presents with    Consult     Patient is here for his back. Patient tingling on the right bottom of his foot. Pain the pelvis. Level 2. No balance issues.        Initial Vitals:  /75   Pulse 56   Ht 6' (1.829 m)   Wt 188 lb 11.2 oz (85.6 kg)   SpO2 99%   BMI 25.59 kg/m   Estimated body mass index is 25.59 kg/m  as calculated from the following:    Height as of this encounter: 6' (1.829 m).    Weight as of this encounter: 188 lb 11.2 oz (85.6 kg). Body surface area is 2.09 meters squared. BP completed using cuff size: X-large  Mild Pain (2)        Courtney Rivera

## 2025-06-09 ENCOUNTER — THERAPY VISIT (OUTPATIENT)
Dept: PHYSICAL THERAPY | Facility: CLINIC | Age: 45
End: 2025-06-09
Attending: STUDENT IN AN ORGANIZED HEALTH CARE EDUCATION/TRAINING PROGRAM
Payer: COMMERCIAL

## 2025-06-09 DIAGNOSIS — M54.41 ACUTE RIGHT-SIDED LOW BACK PAIN WITH RIGHT-SIDED SCIATICA: Primary | ICD-10-CM

## 2025-06-09 PROCEDURE — 97110 THERAPEUTIC EXERCISES: CPT | Mod: GP | Performed by: PHYSICAL THERAPIST

## 2025-07-02 ENCOUNTER — THERAPY VISIT (OUTPATIENT)
Dept: PHYSICAL THERAPY | Facility: CLINIC | Age: 45
End: 2025-07-02
Attending: STUDENT IN AN ORGANIZED HEALTH CARE EDUCATION/TRAINING PROGRAM
Payer: COMMERCIAL

## 2025-07-02 DIAGNOSIS — M54.41 ACUTE RIGHT-SIDED LOW BACK PAIN WITH RIGHT-SIDED SCIATICA: Primary | ICD-10-CM

## 2025-07-02 PROCEDURE — 97112 NEUROMUSCULAR REEDUCATION: CPT | Mod: GP | Performed by: PHYSICAL THERAPIST

## 2025-07-02 PROCEDURE — 97110 THERAPEUTIC EXERCISES: CPT | Mod: GP | Performed by: PHYSICAL THERAPIST

## 2025-07-02 PROCEDURE — 97530 THERAPEUTIC ACTIVITIES: CPT | Mod: GP | Performed by: PHYSICAL THERAPIST

## 2025-07-07 ENCOUNTER — THERAPY VISIT (OUTPATIENT)
Dept: PHYSICAL THERAPY | Facility: CLINIC | Age: 45
End: 2025-07-07
Attending: STUDENT IN AN ORGANIZED HEALTH CARE EDUCATION/TRAINING PROGRAM
Payer: COMMERCIAL

## 2025-07-07 DIAGNOSIS — M54.41 ACUTE RIGHT-SIDED LOW BACK PAIN WITH RIGHT-SIDED SCIATICA: Primary | ICD-10-CM

## 2025-07-07 PROCEDURE — 97110 THERAPEUTIC EXERCISES: CPT | Mod: GP | Performed by: PHYSICAL THERAPIST

## 2025-07-07 PROCEDURE — 97530 THERAPEUTIC ACTIVITIES: CPT | Mod: GP | Performed by: PHYSICAL THERAPIST

## 2025-07-13 ENCOUNTER — HEALTH MAINTENANCE LETTER (OUTPATIENT)
Age: 45
End: 2025-07-13

## 2025-07-15 ENCOUNTER — THERAPY VISIT (OUTPATIENT)
Dept: PHYSICAL THERAPY | Facility: CLINIC | Age: 45
End: 2025-07-15
Attending: STUDENT IN AN ORGANIZED HEALTH CARE EDUCATION/TRAINING PROGRAM
Payer: COMMERCIAL

## 2025-07-15 DIAGNOSIS — M54.41 ACUTE RIGHT-SIDED LOW BACK PAIN WITH RIGHT-SIDED SCIATICA: Primary | ICD-10-CM

## 2025-07-15 PROCEDURE — 97110 THERAPEUTIC EXERCISES: CPT | Mod: GP | Performed by: PHYSICAL THERAPIST

## 2025-07-15 PROCEDURE — 97112 NEUROMUSCULAR REEDUCATION: CPT | Mod: GP | Performed by: PHYSICAL THERAPIST

## 2025-07-18 ENCOUNTER — MYC MEDICAL ADVICE (OUTPATIENT)
Dept: PHYSICAL MEDICINE AND REHAB | Facility: CLINIC | Age: 45
End: 2025-07-18
Payer: COMMERCIAL

## 2025-07-18 DIAGNOSIS — M51.369 DEGENERATION OF INTERVERTEBRAL DISC OF LUMBAR REGION, UNSPECIFIED WHETHER PAIN PRESENT: ICD-10-CM

## 2025-07-18 DIAGNOSIS — G89.29 CHRONIC BILATERAL LOW BACK PAIN WITH LEFT-SIDED SCIATICA: ICD-10-CM

## 2025-07-18 DIAGNOSIS — M54.16 LUMBAR RADICULOPATHY: ICD-10-CM

## 2025-07-18 DIAGNOSIS — M54.42 CHRONIC BILATERAL LOW BACK PAIN WITH LEFT-SIDED SCIATICA: ICD-10-CM

## 2025-07-21 RX ORDER — GABAPENTIN 300 MG/1
300 CAPSULE ORAL AT BEDTIME
Qty: 90 CAPSULE | Refills: 0 | Status: SHIPPED | OUTPATIENT
Start: 2025-07-21

## 2025-07-23 ENCOUNTER — THERAPY VISIT (OUTPATIENT)
Dept: PHYSICAL THERAPY | Facility: CLINIC | Age: 45
End: 2025-07-23
Attending: STUDENT IN AN ORGANIZED HEALTH CARE EDUCATION/TRAINING PROGRAM
Payer: COMMERCIAL

## 2025-07-23 DIAGNOSIS — M54.41 ACUTE RIGHT-SIDED LOW BACK PAIN WITH RIGHT-SIDED SCIATICA: Primary | ICD-10-CM

## 2025-07-23 DIAGNOSIS — M51.369 DEGENERATION OF INTERVERTEBRAL DISC OF LUMBAR REGION, UNSPECIFIED WHETHER PAIN PRESENT: ICD-10-CM

## 2025-07-23 PROCEDURE — 97530 THERAPEUTIC ACTIVITIES: CPT | Mod: GP | Performed by: PHYSICAL THERAPIST

## 2025-07-23 PROCEDURE — 97110 THERAPEUTIC EXERCISES: CPT | Mod: GP | Performed by: PHYSICAL THERAPIST

## 2025-07-23 PROCEDURE — 97112 NEUROMUSCULAR REEDUCATION: CPT | Mod: GP | Performed by: PHYSICAL THERAPIST

## 2025-07-28 ENCOUNTER — HOSPITAL ENCOUNTER (OUTPATIENT)
Dept: MRI IMAGING | Facility: CLINIC | Age: 45
Discharge: HOME | End: 2025-07-28
Attending: STUDENT IN AN ORGANIZED HEALTH CARE EDUCATION/TRAINING PROGRAM | Admitting: STUDENT IN AN ORGANIZED HEALTH CARE EDUCATION/TRAINING PROGRAM
Payer: COMMERCIAL

## 2025-07-28 DIAGNOSIS — M51.369 DEGENERATION OF INTERVERTEBRAL DISC OF LUMBAR REGION, UNSPECIFIED WHETHER PAIN PRESENT: ICD-10-CM

## 2025-07-28 DIAGNOSIS — M54.16 LUMBAR RADICULOPATHY: ICD-10-CM

## 2025-07-28 PROCEDURE — 72148 MRI LUMBAR SPINE W/O DYE: CPT

## 2025-07-29 ENCOUNTER — OFFICE VISIT (OUTPATIENT)
Dept: PHYSICAL MEDICINE AND REHAB | Facility: CLINIC | Age: 45
End: 2025-07-29
Payer: COMMERCIAL

## 2025-07-29 VITALS
BODY MASS INDEX: 25.73 KG/M2 | DIASTOLIC BLOOD PRESSURE: 91 MMHG | HEIGHT: 72 IN | SYSTOLIC BLOOD PRESSURE: 154 MMHG | OXYGEN SATURATION: 98 % | WEIGHT: 190 LBS | HEART RATE: 70 BPM

## 2025-07-29 DIAGNOSIS — M51.369 DEGENERATION OF INTERVERTEBRAL DISC OF LUMBAR REGION, UNSPECIFIED WHETHER PAIN PRESENT: ICD-10-CM

## 2025-07-29 DIAGNOSIS — G89.29 CHRONIC BILATERAL LOW BACK PAIN WITH LEFT-SIDED SCIATICA: ICD-10-CM

## 2025-07-29 DIAGNOSIS — M54.42 CHRONIC BILATERAL LOW BACK PAIN WITH LEFT-SIDED SCIATICA: ICD-10-CM

## 2025-07-29 DIAGNOSIS — M54.16 LUMBAR RADICULOPATHY: ICD-10-CM

## 2025-07-29 PROCEDURE — 3080F DIAST BP >= 90 MM HG: CPT | Performed by: STUDENT IN AN ORGANIZED HEALTH CARE EDUCATION/TRAINING PROGRAM

## 2025-07-29 PROCEDURE — 1125F AMNT PAIN NOTED PAIN PRSNT: CPT | Performed by: STUDENT IN AN ORGANIZED HEALTH CARE EDUCATION/TRAINING PROGRAM

## 2025-07-29 PROCEDURE — 3077F SYST BP >= 140 MM HG: CPT | Performed by: STUDENT IN AN ORGANIZED HEALTH CARE EDUCATION/TRAINING PROGRAM

## 2025-07-29 PROCEDURE — 99215 OFFICE O/P EST HI 40 MIN: CPT | Performed by: STUDENT IN AN ORGANIZED HEALTH CARE EDUCATION/TRAINING PROGRAM

## 2025-07-29 RX ORDER — GABAPENTIN 300 MG/1
300 CAPSULE ORAL EVERY 12 HOURS
Qty: 60 CAPSULE | Refills: 1 | Status: SHIPPED | OUTPATIENT
Start: 2025-07-29 | End: 2025-09-27

## 2025-07-29 ASSESSMENT — PAIN SCALES - GENERAL: PAINLEVEL_OUTOF10: MILD PAIN (3)

## 2025-07-29 NOTE — PATIENT INSTRUCTIONS
~Please call our [facility] Spine Nurse Navigation #[phone] with any questions or concerns about your treatment plan, if symptoms worsen and you would like to be seen urgently, or if you have problems controlling bladder and bowel function.  ~For any future flareups or new symptoms, recommend follow-up in clinic or contact the nurse navigator line.  ~Please note that any My Chart messages may take multiple days for a response due to the high volume of patients seen in clinic.  Anything sent Friday night or after will be answered the following week when able.     Physical Therapy:  We have placed a referral for you to start physical therapy. You should be receiving a call within the next 2-4 business days to schedule your first session. If you do not hear from the PT clinic within a week please call our clinic for assistance.    Physical therapy is one of the most safe and effective treatments available for spine-related pain. Consistency is the most important factor when it comes to physical therapy - your therapist will give you exercises and stretches that you can perform at home, often without any special equipment. You should try to do those exercises at least once a day as long as they do not worsen your pain. Your therapist is trained to identify when your symptoms are worsening, but if you feel any new symptoms of numbness, weakness, or loss of bladder/bowel control please inform your therapist or call our clinic.    We will plan to follow up with you once your therapy visits are completed.

## 2025-07-29 NOTE — PROGRESS NOTES
ASSESSMENT:  Edward Hinds is a 44 year old male presents for follow-up of :         Diagnoses and all orders for this visit:  Lumbar radiculopathy  -     gabapentin (NEURONTIN) 300 MG capsule; Take 1 capsule (300 mg) by mouth every 12 hours.  -     Physical Therapy  Referral; Future  Degeneration of intervertebral disc of lumbar region, unspecified whether pain present  -     gabapentin (NEURONTIN) 300 MG capsule; Take 1 capsule (300 mg) by mouth every 12 hours.  -     Physical Therapy  Referral; Future  Chronic bilateral low back pain with left-sided sciatica  -     gabapentin (NEURONTIN) 300 MG capsule; Take 1 capsule (300 mg) by mouth every 12 hours.  -     Physical Therapy  Referral; Future       Patient is neurologically intact on exam. No myelopathic or red flag symptoms.    Patient presenting for follow-up and imaging review related to his chronic low back pain with left-sided lumbosacral radiculopathy.  We reviewed the new lumbar MRI which is essentially identical to his previous MRI from December 2024.  Multilevel degenerative changes most notably at the L4-L5 and L5-S1 levels with only mild canal and foraminal narrowing.  Also with Modic changes noted at the L5-S1 endplates.  Patient does endorse symptoms are worsening so we discussed potential options at this juncture.  Could continue with physical therapy at [place] or try MedX therapy as a more intensive option here in [place], could try going up on medication but that would carry an increased risk of cognitive/drowsiness side effects.  Could reconnect with Dr. Lara in light of the worsening pain to see if there are any surgical options though in the absence of any new MRI findings this may not be an option, could reach out to [facility] or [facility] for consideration of the Intracept procedure, or could look into a second opinion consultation with one of the pain clinics in the community.  Patient and his wife had several  questions and all of these were answered within the available time of the clinic appointment.  After discussing at length all of the different options, patient would like to start with increasing the gabapentin dosage and trying MedX therapy.  He will reach out back to us in 4 to 6 weeks to assess progress with MedX and gabapentin increase, and if symptoms are not improving then my recommendation likely will be to look into the Intracept procedure or a second opinion with other pain clinics in the area.    PLAN:  Reviewed spine anatomy and disease process. Discussed diagnosis and treatment options with the patient today. A shared decision making model was used. The patient's values and choices were respected. The following represents what was discussed and decided upon by the provider and the patient.    1. DIAGNOSTIC TESTS  No new imaging orders at this time.    2. PHYSICAL THERAPY  Physical therapy was ordered through [facility] or the external clinic of patient's choice.  MedX therapy specified  Discussed the importance of core strengthening, ROM, stretching exercises with the patient and how each of these entities is important in decreasing pain.  Explained to the patient that the purpose of physical therapy is to teach the patient a home exercise program.  These exercises need to be performed every day in order to decrease pain and prevent future occurrences of pain.  Likened it to brushing one's teeth.      3. MEDICATIONS:  Discussed multiple medication options today with patient. Discussed risks, side effects, and proper use of medications. Patient verbalized understanding.  Increasing gabapentin to 300 mg twice daily, patient advised of side effects and to reach out if he has any    4. INTERVENTIONS:  Interventional treatments are not indicated for patient's presentation.  2 injections done without lasting benefit, and no new findings on MRI to justify a different interventional option  Consider Intracept  procedure which is only available through [facility] or [facility] in this area    5. OTHER REFERRALS:  No other referrals at this time.  Patient can consider follow-up with Dr. Lara as symptoms are worsening  Patient can consider second opinion with area pain clinics    6. FOLLOW-UP  After completion of physical therapy.  Patient advised to contact our office for earlier appointment if symptoms worsening or not improving, or any side effects are noticed.    Advised patient to call the [facility] if symptoms worsen or you have problems controlling bladder and bowel function.     At least 40 minutes was spent on the date of service reviewing the chart, reviewing prior new imaging, evaluating the patient, discussion of diagnosis and treatment plan, and coordination of care with neurosurgery service.  ______________________________________________________________________    SUBJECTIVE:   Edward Hinds  is a 44 year old male who presents today for follow-up evaluation.  He presents today with his spouse who helped provide collateral information.    Patient reports no significant changes with symptoms.  Continues to be in what sounds like quite severe at times back pain which is affecting his function and independence.  Pain is still traveling down the left lower extremity at times.  Reports that the gabapentin is helping to take the edge off of it but is wondering if maybe a dose increase would be additionally beneficial.  Continuing to do PT with the clinic in [place].  Reports the previous 2 injections only provided very brief benefit for short duration.    Since our last visit, patient was seen by my colleague Dr. Lara in neurosurgery with no surgical recommendation as he was seeing results with conservative management.  Patient reached out more recently saying that symptoms are worsening so we ordered a new MRI and he comes in today to review the imaging.    No lyssa numbness or weakness, no bladder or bowel  incontinence reported.    No prior back surgeries    -Treatment to Date: As above    1/15/25 - [facility] - L4-L5 ILESI - minimal relief for short term  5/2/25 - [facility] - Left L5-S1 TFESI - minimal relief for short term      Oswestry (BRITTNEY) Questionnaire        5/27/2025    12:00 PM   OSWESTRY DISABILITY INDEX   Count 10   Sum 12   Oswestry Score (%) 24 %       Neck Disability Index:       No data to display                       -Medications:    Current Outpatient Medications   Medication Sig Dispense Refill    gabapentin (NEURONTIN) 300 MG capsule Take 1 capsule (300 mg) by mouth every 12 hours. 60 capsule 1    cyclobenzaprine (FLEXERIL) 10 MG tablet Take 1 tablet (10 mg) by mouth 3 times daily as needed for muscle spasms (Patient not taking: Reported on 7/29/2025) 15 tablet 0    cyclobenzaprine (FLEXERIL) 5 MG tablet Take 1-2 tablets (5-10 mg) by mouth 3 times daily as needed for muscle spasms. (Patient not taking: Reported on 7/29/2025) 45 tablet 0    HYDROcodone-acetaminophen (NORCO) 5-325 MG tablet Take 1 tablet by mouth every 8 hours as needed for severe pain. (Patient not taking: Reported on 7/29/2025) 12 tablet 0    ibuprofen (ADVIL/MOTRIN) 200 MG tablet Take 600 mg by mouth once. (Patient not taking: Reported on 7/29/2025)      methylPREDNISolone (MEDROL DOSEPAK) 4 MG tablet therapy pack Follow Package Directions (Patient not taking: Reported on 7/29/2025) 21 tablet 0    naproxen (NAPROSYN) 500 MG tablet Take 1 tablet (500 mg) by mouth 2 times daily as needed for moderate pain. (Patient not taking: Reported on 7/29/2025) 30 tablet 1    naproxen (NAPROSYN) 500 MG tablet Take 1 tablet (500 mg) by mouth 2 times daily as needed for moderate pain. (Patient not taking: Reported on 7/29/2025) 30 tablet 30    oxyCODONE (ROXICODONE) 5 MG tablet Take 1 tablet (5 mg) by mouth every 6 hours as needed for severe pain (7-10) (Patient not taking: Reported on 7/29/2025) 6 tablet 0     No current facility-administered  medications for this visit.       Allergies   Allergen Reactions    Penicillin [Penicillins]      Mother says penicillin         No past medical history on file.     Patient Active Problem List   Diagnosis    CARDIOVASCULAR SCREENING; LDL GOAL LESS THAN 160    GERD (gastroesophageal reflux disease)    Acute right-sided low back pain with right-sided sciatica       No past surgical history on file.    Family History   Problem Relation Age of Onset    Hypertension Father     Alzheimer Disease Maternal Grandmother     Heart Disease Maternal Grandfather        Reviewed past medical, surgical, and family history with patient found on new patient intake packet located in EMR Media tab.     SOCIAL HX: Reviewed    ROS: Specifically negative for bowel/bladder dysfunction, balance changes, headache, dizziness, foot drop, fevers, chills, appetite changes, nausea/vomiting, unexplained weight loss. Otherwise 13 systems reviewed are negative. Please see the patient's intake questionnaire from today for details.    OBJECTIVE:  BP (!) 154/91 (BP Location: Left arm, Patient Position: Sitting, Cuff Size: Adult Regular)   Pulse 70   Ht 6' (1.829 m)   Wt 190 lb (86.2 kg)   SpO2 98%   BMI 25.77 kg/m      PHYSICAL EXAMINATION: Exam reviewed and updated below  --CONSTITUTIONAL: Vital signs as above. No acute distress. The patient is well nourished and well groomed.  --PSYCHIATRIC: The patient is awake, alert, oriented to person, place, time and answering questions appropriately with clear speech. Appropriate mood and affect   --RESPIRATORY: Normal rhythm and effort. No abnormal accessory muscle breathing patterns noted.   --GROSS MOTOR: Easily arises from a seated position.  Gait is symmetric and narrow based  --LUMBAR SPINE: Inspection reveals no evidence of deformity. Range of motion is not limited in flexion, extension, lateral rotation. Mild tenderness to palpation of lumbar paraspinals, no tenderness in spinous processes.  Facet  loading (Magallanes test) negative, seated SLR negative  --HIPS: Full range of motion bilaterally.  --LOWER EXTREMITY MOTOR TESTING:  Hip flexion left 5/5, right 5/5  Knee extension left 5/5, right 5/5  Ankle dorsiflexors left 5/5, right 5/5  Ankle plantarflexors left 5/5, right 5/5   Great toe extension left 5/5, right 5/5   Knee flexion left 5/5, right 5/5  --NEUROLOGIC: Sensation to lower extremities is intact bilaterally in L2-S1 dermatomes.  Negative Mckeon's bilaterally. Reflexes intact in BLE, no clonus.  --VASCULAR: Warm upper limbs bilaterally. Capillary refill in the upper extremities is less than 1 second.    RESULTS: Available medical records from [facility] and any other outside records were reviewed today.     Imaging:  Available relevant imaging was personally reviewed and interpreted today. The images were shown to the patient and the findings were explained using a spine model.    12/26/2024 MRI lumbar spine:    IMPRESSION:  Multilevel degenerative changes in the lumbar spine. No  high-grade canal stenotic findings. Please see above discussion..       7/28/2025 MRI lumbar spine:    IMPRESSION:  1.  At L4-L5 there is mild spinal canal stenosis and mild bilateral neural foraminal stenosis.  2.  Mixed Modic type I and type II endplate changes at L5-S1.  3.  Additional mild multilevel lumbar spondylosis, not significantly changed from the prior MRI, as detailed above.

## 2025-07-29 NOTE — LETTER
7/29/2025      Edward Hinds  [address] [place] [address]      Dear Colleague,    Thank you for referring your patient, Edward Hinds, to the [facility]. Please see a copy of my visit note below.    ASSESSMENT:  Edward Hinds is a 44 year old male presents for follow-up of :         Diagnoses and all orders for this visit:  Lumbar radiculopathy  -     gabapentin (NEURONTIN) 300 MG capsule; Take 1 capsule (300 mg) by mouth every 12 hours.  -     Physical Therapy  Referral; Future  Degeneration of intervertebral disc of lumbar region, unspecified whether pain present  -     gabapentin (NEURONTIN) 300 MG capsule; Take 1 capsule (300 mg) by mouth every 12 hours.  -     Physical Therapy  Referral; Future  Chronic bilateral low back pain with left-sided sciatica  -     gabapentin (NEURONTIN) 300 MG capsule; Take 1 capsule (300 mg) by mouth every 12 hours.  -     Physical Therapy  Referral; Future       Patient is neurologically intact on exam. No myelopathic or red flag symptoms.    Patient presenting for follow-up and imaging review related to his chronic low back pain with left-sided lumbosacral radiculopathy.  We reviewed the new lumbar MRI which is essentially identical to his previous MRI from December 2024.  Multilevel degenerative changes most notably at the L4-L5 and L5-S1 levels with only mild canal and foraminal narrowing.  Also with Modic changes noted at the L5-S1 endplates.  Patient does endorse symptoms are worsening so we discussed potential options at this juncture.  Could continue with physical therapy at [place] or try MedX therapy as a more intensive option here in [place], could try going up on medication but that would carry an increased risk of cognitive/drowsiness side effects.  Could reconnect with Dr. Lara in light of the worsening pain to see if there are any surgical options though in the absence of any new MRI findings  this may not be an option, could reach out to [facility] or [facility] for consideration of the Intracept procedure, or could look into a second opinion consultation with one of the pain clinics in the community.  Patient and his wife had several questions and all of these were answered within the available time of the clinic appointment.  After discussing at length all of the different options, patient would like to start with increasing the gabapentin dosage and trying MedX therapy.  He will reach out back to us in 4 to 6 weeks to assess progress with MedX and gabapentin increase, and if symptoms are not improving then my recommendation likely will be to look into the Intracept procedure or a second opinion with other pain clinics in the area.    PLAN:  Reviewed spine anatomy and disease process. Discussed diagnosis and treatment options with the patient today. A shared decision making model was used. The patient's values and choices were respected. The following represents what was discussed and decided upon by the provider and the patient.    1. DIAGNOSTIC TESTS  No new imaging orders at this time.    2. PHYSICAL THERAPY  Physical therapy was ordered through [place] or the external clinic of patient's choice.  MedX therapy specified  Discussed the importance of core strengthening, ROM, stretching exercises with the patient and how each of these entities is important in decreasing pain.  Explained to the patient that the purpose of physical therapy is to teach the patient a home exercise program.  These exercises need to be performed every day in order to decrease pain and prevent future occurrences of pain.  Likened it to brushing one's teeth.      3. MEDICATIONS:  Discussed multiple medication options today with patient. Discussed risks, side effects, and proper use of medications. Patient verbalized understanding.  Increasing gabapentin to 300 mg twice daily, patient advised of side effects and to reach out if he has  any    4. INTERVENTIONS:  Interventional treatments are not indicated for patient's presentation.  2 injections done without lasting benefit, and no new findings on MRI to justify a different interventional option  Consider Intracept procedure which is only available through [facility] or [facility] in this area    5. OTHER REFERRALS:  No other referrals at this time.  Patient can consider follow-up with Dr. Lara as symptoms are worsening  Patient can consider second opinion with area pain clinics    6. FOLLOW-UP  After completion of physical therapy.  Patient advised to contact our office for earlier appointment if symptoms worsening or not improving, or any side effects are noticed.    Advised patient to call the [facility] if symptoms worsen or you have problems controlling bladder and bowel function.     At least 40 minutes was spent on the date of service reviewing the chart, reviewing prior new imaging, evaluating the patient, discussion of diagnosis and treatment plan, and coordination of care with neurosurgery service.  ______________________________________________________________________    SUBJECTIVE:   Edward Hinds  is a 44 year old male who presents today for follow-up evaluation.  He presents today with his spouse who helped provide collateral information.    Patient reports no significant changes with symptoms.  Continues to be in what sounds like quite severe at times back pain which is affecting his function and independence.  Pain is still traveling down the left lower extremity at times.  Reports that the gabapentin is helping to take the edge off of it but is wondering if maybe a dose increase would be additionally beneficial.  Continuing to do PT with the clinic in [place].  Reports the previous 2 injections only provided very brief benefit for short duration.    Since our last visit, patient was seen by my colleague Dr. Lara in neurosurgery with no surgical recommendation as he was seeing  results with conservative management.  Patient reached out more recently saying that symptoms are worsening so we ordered a new MRI and he comes in today to review the imaging.    No lyssa numbness or weakness, no bladder or bowel incontinence reported.    No prior back surgeries    -Treatment to Date: As above    1/15/25 - [facility] - L4-L5 ILESI - minimal relief for short term  5/2/25 - [facility] - Left L5-S1 TFESI - minimal relief for short term      Oswestry (BRITTNEY) Questionnaire        5/27/2025    12:00 PM   OSWESTRY DISABILITY INDEX   Count 10   Sum 12   Oswestry Score (%) 24 %       Neck Disability Index:       No data to display                       -Medications:    Current Outpatient Medications   Medication Sig Dispense Refill     gabapentin (NEURONTIN) 300 MG capsule Take 1 capsule (300 mg) by mouth every 12 hours. 60 capsule 1     cyclobenzaprine (FLEXERIL) 10 MG tablet Take 1 tablet (10 mg) by mouth 3 times daily as needed for muscle spasms (Patient not taking: Reported on 7/29/2025) 15 tablet 0     cyclobenzaprine (FLEXERIL) 5 MG tablet Take 1-2 tablets (5-10 mg) by mouth 3 times daily as needed for muscle spasms. (Patient not taking: Reported on 7/29/2025) 45 tablet 0     HYDROcodone-acetaminophen (NORCO) 5-325 MG tablet Take 1 tablet by mouth every 8 hours as needed for severe pain. (Patient not taking: Reported on 7/29/2025) 12 tablet 0     ibuprofen (ADVIL/MOTRIN) 200 MG tablet Take 600 mg by mouth once. (Patient not taking: Reported on 7/29/2025)       methylPREDNISolone (MEDROL DOSEPAK) 4 MG tablet therapy pack Follow Package Directions (Patient not taking: Reported on 7/29/2025) 21 tablet 0     naproxen (NAPROSYN) 500 MG tablet Take 1 tablet (500 mg) by mouth 2 times daily as needed for moderate pain. (Patient not taking: Reported on 7/29/2025) 30 tablet 1     naproxen (NAPROSYN) 500 MG tablet Take 1 tablet (500 mg) by mouth 2 times daily as needed for moderate pain. (Patient not taking: Reported on  7/29/2025) 30 tablet 30     oxyCODONE (ROXICODONE) 5 MG tablet Take 1 tablet (5 mg) by mouth every 6 hours as needed for severe pain (7-10) (Patient not taking: Reported on 7/29/2025) 6 tablet 0     No current facility-administered medications for this visit.       Allergies   Allergen Reactions     Penicillin [Penicillins]      Mother says penicillin         No past medical history on file.     Patient Active Problem List   Diagnosis     CARDIOVASCULAR SCREENING; LDL GOAL LESS THAN 160     GERD (gastroesophageal reflux disease)     Acute right-sided low back pain with right-sided sciatica       No past surgical history on file.    Family History   Problem Relation Age of Onset     Hypertension Father      Alzheimer Disease Maternal Grandmother      Heart Disease Maternal Grandfather        Reviewed past medical, surgical, and family history with patient found on new patient intake packet located in EMR Media tab.     SOCIAL HX: Reviewed    ROS: Specifically negative for bowel/bladder dysfunction, balance changes, headache, dizziness, foot drop, fevers, chills, appetite changes, nausea/vomiting, unexplained weight loss. Otherwise 13 systems reviewed are negative. Please see the patient's intake questionnaire from today for details.    OBJECTIVE:  BP (!) 154/91 (BP Location: Left arm, Patient Position: Sitting, Cuff Size: Adult Regular)   Pulse 70   Ht 6' (1.829 m)   Wt 190 lb (86.2 kg)   SpO2 98%   BMI 25.77 kg/m      PHYSICAL EXAMINATION: Exam reviewed and updated below  --CONSTITUTIONAL: Vital signs as above. No acute distress. The patient is well nourished and well groomed.  --PSYCHIATRIC: The patient is awake, alert, oriented to person, place, time and answering questions appropriately with clear speech. Appropriate mood and affect   --RESPIRATORY: Normal rhythm and effort. No abnormal accessory muscle breathing patterns noted.   --GROSS MOTOR: Easily arises from a seated position.  Gait is symmetric and  narrow based  --LUMBAR SPINE: Inspection reveals no evidence of deformity. Range of motion is not limited in flexion, extension, lateral rotation. Mild tenderness to palpation of lumbar paraspinals, no tenderness in spinous processes.  Facet loading (Magallanes test) negative, seated SLR negative  --HIPS: Full range of motion bilaterally.  --LOWER EXTREMITY MOTOR TESTING:  Hip flexion left 5/5, right 5/5  Knee extension left 5/5, right 5/5  Ankle dorsiflexors left 5/5, right 5/5  Ankle plantarflexors left 5/5, right 5/5   Great toe extension left 5/5, right 5/5   Knee flexion left 5/5, right 5/5  --NEUROLOGIC: Sensation to lower extremities is intact bilaterally in L2-S1 dermatomes.  Negative Mckeon's bilaterally. Reflexes intact in BLE, no clonus.  --VASCULAR: Warm upper limbs bilaterally. Capillary refill in the upper extremities is less than 1 second.    RESULTS: Available medical records from [facility] and any other outside records were reviewed today.     Imaging:  Available relevant imaging was personally reviewed and interpreted today. The images were shown to the patient and the findings were explained using a spine model.    12/26/2024 MRI lumbar spine:    IMPRESSION:  Multilevel degenerative changes in the lumbar spine. No  high-grade canal stenotic findings. Please see above discussion..       7/28/2025 MRI lumbar spine:    IMPRESSION:  1.  At L4-L5 there is mild spinal canal stenosis and mild bilateral neural foraminal stenosis.  2.  Mixed Modic type I and type II endplate changes at L5-S1.  3.  Additional mild multilevel lumbar spondylosis, not significantly changed from the prior MRI, as detailed above.    Again, thank you for allowing me to participate in the care of your patient.        Sincerely,        Bacilio Vo MD    Electronically signed

## 2025-09-02 ENCOUNTER — MYC MEDICAL ADVICE (OUTPATIENT)
Dept: PHYSICAL MEDICINE AND REHAB | Facility: CLINIC | Age: 45
End: 2025-09-02
Payer: COMMERCIAL

## (undated) RX ORDER — LIDOCAINE HYDROCHLORIDE 10 MG/ML
INJECTION, SOLUTION EPIDURAL; INFILTRATION; INTRACAUDAL; PERINEURAL
Status: DISPENSED
Start: 2022-05-13

## (undated) RX ORDER — BETAMETHASONE SODIUM PHOSPHATE AND BETAMETHASONE ACETATE 3; 3 MG/ML; MG/ML
INJECTION, SUSPENSION INTRA-ARTICULAR; INTRALESIONAL; INTRAMUSCULAR; SOFT TISSUE
Status: DISPENSED
Start: 2022-05-13